# Patient Record
Sex: FEMALE | Race: WHITE | NOT HISPANIC OR LATINO | Employment: OTHER | URBAN - METROPOLITAN AREA
[De-identification: names, ages, dates, MRNs, and addresses within clinical notes are randomized per-mention and may not be internally consistent; named-entity substitution may affect disease eponyms.]

---

## 2024-06-14 ENCOUNTER — TRANSFERRED RECORDS (OUTPATIENT)
Dept: HEALTH INFORMATION MANAGEMENT | Facility: CLINIC | Age: 69
End: 2024-06-14

## 2024-08-02 ENCOUNTER — DOCUMENTATION ONLY (OUTPATIENT)
Dept: TRANSPLANT | Facility: CLINIC | Age: 69
End: 2024-08-02

## 2024-08-02 ENCOUNTER — TELEPHONE (OUTPATIENT)
Dept: TRANSPLANT | Facility: CLINIC | Age: 69
End: 2024-08-02

## 2024-08-02 NOTE — TELEPHONE ENCOUNTER
"Donor Intake Start:24Donor Intake Complete:24  Expiration Date:24  Gender:FemalePreferred Language:English  Full Name:Shaniqua James  Needed:[not answered]  Preferred Name:Shaniqua  Phone Number:[phone] Phone:[phone]  Contact Preference:[not answered]Best Contact Time:10am - 5pm  Emergency Contact:Grady Pace Contact #:[phone]  Relationship to Contact:Contact is my spouse  :59Age:68  Country:[place]  Address:[address] [place] [address] ECity:Bianca  [id number]  Height:5'6\"Weight:140lbs  BMI:22.6  Employment Status:RetiredHas PTO for donation?[not answered]  Occupation:[not answered]Requires Heavy Lifting?[not answered]  Education Level:[employer] School Or AssocMarital Status:  Exercise Routine:4-6/WeekHealth Insurance:  Yes  Blood Type:OEthnicity/Race:White  Donor Type:Directed Donor  Prefer Remote Donation:[not answered]  Physician:Dr. Corey Valenzuela, [place]  Motivation to donate:  My brother needs a kidney transplant  Living Donor Pre-Screening  Is In [place]  Yes  Will Accept Blood Transfusions?  Yes  Has been Diagnosed with Kidney Disease?  No  Has had a Heart Attack?  No  Has Diabetes?  No  Has had Cancer?  No  Has had Kidney Stones?  Yes    - number of episodes?2    - last stones passed?2 years    - last stones treated?With medical intervention  Has ever been Pregnant?  Yes    - Is Currently Pregnant?  No    - Months Since Pregnancy?24+    - Is Currently Nursing?  No    - Gestational Diabetes?  Yes    - Hypertension during pregnancy?  Never  Is Planning on Pregnancy?  No  Is Taking Birth Control?  No  Has Used Tobacco  No  Has HIV?  No  Is Currently Incarcerated?  No  Is Currently Residing in [place]  Yes  History Misc  Has Allergies?  Yes  Allergy  Cats, trees and grasses  Has had Surgeries?  Yes  Surgery When  Shoulder-right 2012  Shoulder-left 2014  Ankle 2016  Thumb/wrist   Takes Medication?  No  Medical History  History of High " BP?  Never  Has History Of CABG (bypass surgery)?  No  History of Blood Clots?  Never  History of Coronary Disease?  Never  Has Stents Implanted?  No  Has History of Chest Pain with Exercise?  No  Has History of Chest Pain at Other Times?  No  Results of Climbing 2 Flights of Stairs?No Problem  Has had Stress Test within Last Year?  No  Has had Stroke?  No  Has had Leg Bypass?  No  History of Lung Disease?  Never  History of COPD?  Never  History of TB?  Never    - Is TB Active?[not answered]  History of Pneumonia?  Never  Has Respiratory Issues?  No  Has Gastro Issues?  No  History of Gallstones?  Never  History of Pancreatitis?  Never  History of Liver Disease?  Never  History of Hepatitis B?  Never    - Is Hep B Active?[not answered]  History of Hepatitis C?  Never  History of Bleeding Problem?  Never  History of UTIs?  Yes    - UTI episodes:3    - Last UTI:Current (less than 1yr)  History of Kidney Damage?  Never  History of Proteinuria?  Unknown  History of Hematuria?  Treated in past  History of Neuro Disease?  Never  History of Seizure?  Never  History of Lupus?  Never  History of Paralysis?  Never  History of Arthritis?  Still being treated  History of Neuropathy?  Still being treated  History of Depression?  Never  History of Anxiety?  Never  History of Documented Psychiatric Illness?  Never  History of Fibroid Uterus?  Never  History of Endometriosis?  Never  History of Polycystic Ovaries?  Never  Has had Miscarriages?  No  Has had Abortions?  No  Has had Transfusions?  No  History of Obesity?  No  History of Fabry's Disease?  No  History of Sickle Cell Disease?  No  History of Sickle Cell Trait?  No  History of Sarcoidosis?  No  History of Auto-Immune Disease  Unknown  Has had Physical Exam?  Yes    - how many years ago:1  Has had Mammogram?  Yes    - how many years ago:1  Has had Pap Smear?  Yes    - how many years ago:1  Has had Colonoscopy?  Yes    - How Many Years Ago:2  Medical History  Comments?Fibromyalgia  Living Donor Family Medical History  Anyone with kidney disease?  Yes    - which family members:Mom and brother  Anyone with liver disease?  No  Anyone with heart disease?  Yes    - which family members:Mom  Anyone with coronary artery disease?  Unknown  Anyone with high blood pressure?  Yes    - which family members:Father, mother, brothers  Anyone with blood disorder?  No  Anyone with cancer?  Unknown  Anyone with kidney cancer?  No  Anyone with diabetes?  No  Is mother alive?  No  Mother's age?89  Mother's cause of death?Kidney failure  Is father alive?  No  Father's age?83  Father's cause of death?Lung failure  How many siblings?2  How many adult children?2  How many children under 18?0  Social History  Has Used Alcohol?  Yes    - currently uses alcohol:  Yes    - how much:1/Daily  Has Abused Alcohol?  No  Has Used Drugs?  No  Has had legal issues w/ law enforcement?  No  Traveled over 100 miles from home in last year?  Yes    - Traveled Where?[place] and [place]  Has had suicidal thoughts or attempts in the last five years?  No

## 2024-08-06 ENCOUNTER — TELEPHONE (OUTPATIENT)
Dept: TRANSPLANT | Facility: CLINIC | Age: 69
End: 2024-08-06

## 2024-08-06 NOTE — TELEPHONE ENCOUNTER
Initial Independent Living Donor Advocate contact made with potential donor today.  I introduced myself and my role during the donation process, including:   ANGELIKA ROLE   The federal government requires that all licensed transplant centers provide the living donor with an Independent Living Donor Advocate (ANGELIKA).  I do not meet recipients or attend meetings that discuss their care or decision to transplant them. My role is separate to avoid any conflict of interest.  My role is to ensure:  1) your rights are protected;  2) you get all the information you need from the transplant team to make a fully informed decision whether to donate;   3) that living donation is in your best interest.   4) that you have the right to decide NOT to go forward with living donation at any time during this process.  I am available to you throughout the workup, during surgery phase and follow-up at home.     WORKUP & PRIVACY   Your identity and workup are not shared with the recipient at any time.   The recipient's insurance covers the medical expenses related to the donor evaluation and surgery.  However, it is important that you carry your own health insurance to address any medical issues that are found and are NOT related to living donation.  Additionally, age appropriate cancer screening (I.e. mammograms,  colonoscopies, etc) is required and would be through your insurance.  There is a psychosocial and medical donor workup that consists of testing to determine if you are healthy enough to donate. Workup tests include tissue typing/genetics, many blood draws, urine collection/ (kidney function testing), chest x-ray, EKG/other heart testing, CT scan. Age appropriate cancer screening is required and would be through your insurance. As you complete each step then you may move on to the next.  Workup can take as little or as long as you need and you can stop the process at any time. Transplant is a treatment option, not a cure. A kidney  from a living kidney donor can last 12-14 years.  Other treatment options are  donation and two types of dialysis.   This is major surgery and your estimated hospital stay is approximately 1-2 nights.  After surgery, there are driving and lifting restrictions - no driving for two weeks and no lifting over ten pounds for 8 weeks.  Donors are routinely off from work for 4 - 6 weeks after surgery, and potentially longer if they have a physical job.     If you anticipate lost wages due to donation, donor wage reimbursement options may be available to you and will be reviewed with you during the evaluation process. Donor Shield and NLDAC explained.  We reviewed the importance of completing follow-up labs and surveys at six months, 1 year and 2 years after donation to monitor kidney health and the impact donation has had on their life post donation.        QUESTIONS    Have you received the Welcome e-mail that includes copies of the informed consent, financial letter, information on donor shield and NLDAC from the transplant department? Yes.    Have you discussed with anyone your potential decision to donate?   Yes.    Is anyone pressuring or coercing you to donate? No.    Have you discussed any financial arrangements with recipient around donating a kidney? No.    Are you aware that you can confidentially opt out at any time, up to and including day of donation? Yes.    At this time, would you like to proceed with the medical evaluation to see if you can be a kidney donor?  Yes.    If yes, I will make an appointment for your donor coordinator to reach out to you with next steps.     Contact information for ANGELIKA's was provided Yes.    Demographic Information:   Preferred Name: Shaniqua  Preferred Pronouns: she.her  Race/Ethnicity: white    Eulalia Lopez- [phone]  Virginia Demar-  [phone]    Confirmed that she reviewed Informed consent document and all questions answered.  Reviewed that they will receive  Docusign to obtain electronic signature for the following: Informed consent, SRTR data, TANYA for medical information, Auth for Electronic communication, Kidney for Life and will need their signed consent back before proceeding with evaluation.     Time frame for donation: open  Paired exchange was introduced  Yes.      MyChart was initiated  Yes.  CareEverywhere was initiated Yes.    ANGELIKA NOTES: Shaniqua wants to donate to brother. Lives in [place]. Retired. Has health insurance. Scheduled to talk to Marian Vick on 8/13 at 10:45am.     Virginia Benz MaineGeneral Medical CenterGT, CCTSW   Independent Living Donor Advocate  [facility]  Direct: [phone]  E-Mail: [email or web address]    Duration of call 30 minutes

## 2024-08-13 ENCOUNTER — DOCUMENTATION ONLY (OUTPATIENT)
Dept: TRANSPLANT | Facility: CLINIC | Age: 69
End: 2024-08-13

## 2024-08-13 ENCOUNTER — TELEPHONE (OUTPATIENT)
Dept: TRANSPLANT | Facility: CLINIC | Age: 69
End: 2024-08-13

## 2024-08-13 ENCOUNTER — MEDICAL CORRESPONDENCE (OUTPATIENT)
Dept: HEALTH INFORMATION MANAGEMENT | Facility: CLINIC | Age: 69
End: 2024-08-13

## 2024-08-13 NOTE — TELEPHONE ENCOUNTER
Contacted Shaniqua Perez to introduce myself and my role, review of medical/surgical/family history and next steps.     Shaniqua Perez  is aware She can stop donor evaluation at any time.    Regular blood donor? No Last blood donation date NA (Notified donor to avoid blood donation at this time to get accurate blood counts if going through evaluation)     Shaniqua Perez is a 68 year old female  ABO O that would like to learn more about being a donor to her brother. 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     Fibromyalgia- dx around age 42, managed with exercise and lifestyle, doesn't feel it limits her life, lives a very active lifestyle, walks 3-4 miles daily    Current medications and NSAID use:   Prozac- hormonal mood fluctuations  Synthroid  Statin medication for cholesterol   Prn allergy medication   Prn sleeping pill     Allergies reviewed: environmental- cats, grass, trees    Legal issues w/ law enforcement: none    Reviewed any history of travel in endemic areas: [place]  Strongyloides- [place], [place] and [place].  Trypanosoma cruzi ([place])- [place]  West Nile Virus- [place], [place], [place], [place] and [place]. (Included in BRANDON testing prior to donation)    Per our Phase 1 algorithm, does meet criteria to do preliminary testing. Social work screening yes due to travel.      Verified that potential donor was provided the informed consent DocuSign and they are comfortable moving forward to living donor evaluation.    Reviewed evaluation testing: Iohexol, Lab work, CXR, EKG, Provider visits and functions, CT Angiogram.     Reviewed operations  of selection committee and angio review meetings and the need for multidisciplinary input. Post-donation requirements include post-op appointment with your surgeon at 2 weeks after surgery, 6 week, 6 month, 1 year and 2 year lab tests.     Reviewed NKR listing and transfer of care to [facility] team if approved. Provided with NKR website to review.     Briefly went over options if approved of NDD and voucher donation.     Shaniqua would like to proceed with next steps: P1 testing and tissue typing     Encouraged sign up for MyChart and reviewed importance of watching teaching videos prior to evaluation.    Verified recipient status if not NDD.    Donor timeline: tbd     Will send orders to scheduling team to set up for P1 testing.

## 2024-08-15 ENCOUNTER — TRANSFERRED RECORDS (OUTPATIENT)
Dept: HEALTH INFORMATION MANAGEMENT | Facility: CLINIC | Age: 69
End: 2024-08-15

## 2024-09-04 ENCOUNTER — DOCUMENTATION ONLY (OUTPATIENT)
Dept: TRANSPLANT | Facility: CLINIC | Age: 69
End: 2024-09-04

## 2024-09-04 ENCOUNTER — TELEPHONE (OUTPATIENT)
Dept: TRANSPLANT | Facility: CLINIC | Age: 69
End: 2024-09-04

## 2024-09-04 NOTE — PROGRESS NOTES
Incompatible Kidney Transplant Program Meeting     Attendees: Dr. Santillan, Dr. Knox, donor/recip coordinator teams    Discussion:   Compatibility testing including virtual from 3g done by immunology-     ABDRDQmm 2111, medium eplet (DRolinda 10, DQmm11), some DSA on virtual but likely okay pending flow XM.     Recommendations:   Consider paired trail if open to paired exchange, otherwise would need a flow XM to confirm okay for direct.

## 2024-09-04 NOTE — TELEPHONE ENCOUNTER
P1 results appropriate to proceed. Compatibility testing reviewed at [facility] (see note).     Call made to Shaniqua to review above. Plan for [facility] if she wants to proceed.

## 2024-09-04 NOTE — LETTER
PHYSICIAN ORDERS  DONOR LAB ORDERS      Patient Name: Shaniqua Perez   YOB: 1955     [facility] MR# [if applicable]: [id number]   Date & Time: September 10, 2024  2:31 PM  DIAGNOSIS:  Potential kidney donor  ICD-10 CODE:  Z00.5    Interim Transplant Crossmatch [Donor]    1. Please draw enclosed tubes (4 yellow/ACD tubes) to equal 40ml of blood for final pre-transplant crossmatch that will be run at our facility. Please initial and date all tubes. Ship at room temperature.     **Do not draw blood or ship on Friday or Saturday.    2. Send blood to:  Immunology Laboratory     [facility]     Room 7-139 [address] [address]    *See below billing instructions for lab draw.             Mair Hernandez MD FACS  Associate Professor of Surgery  Director, Living Kidney Donor Program.         REIMBURSEMENT INFORMATION FOR LIVING ORGAN DONORS    LIVING ORGAN DONOR: This form MUST accompany & remain attached to Orders &  given to Provider and/or Healthcare Facility Business Office    PROVIDER/FACILITY INSTRUCTIONS: By accepting to perform these services for living organ  donation, the provider/facility agrees to exclusively bill the [facility] instead of billing  the patient or any insurance provider and agrees to accept the reimbursement, as described below, as  payment in full for services rendered.    PROVIDER BILLING INSTRUCTIONS:  1. [facility] agrees to pay for all authorized testing ordered by our transplant  program that is related to living organ donation. The attached orders/tests are part of the donor  Evaluation.    2. Do not bill the donor or donor's insurance. Send an itemized invoice, claim or statement to:    [facility]  Transplant Finance/Donor Billing  [address] [place] [address], [address] [address]    3. Billing statements must include the  patient first and last name, date of birth, the CPT procedure code  and date of service. Please bill service on the ORIGINAL UBO4 or 1500 with appropriate CPT/HCPCS  codes along with W-9 and send to the above address to insure timely reimbursement.    4. Claims should be submitted no later than six months from the date when services are rendered.  Claims denied for late submission should not be billed to the donor or their private insurance carrier.    5. [facility] will reimburse all charges at 100% of the Medicare Fee Schedule as  defined in the Code of Federal Regulations (CFR) 42, Chapter IV. This is to be considered payment  in full. [facility], the patient, and/or the patient's insurance are NOT to  be billed any balance, co-payment, or deductible, per Medicare regulations. **ATTN: Facility  providing services for attached/enclosed Living Donor Orders; If facility does NOT AGREE to  the reimbursement rate stated above, PLEASE DENY SERVICES & refer Donor/patient back to  their [facility] Transplant Center.    6. Patients are NOT to make any payments at the time of service.    Please forward this information to your billing department so that a donor account can be set up with  these instructions.    Should you have any questions, please contact the Donor Billing office at [phone],  Monday - Friday, 8:00 a.m. to 4:00 p.m.   Thank you for your assistance.

## 2024-09-10 ENCOUNTER — DOCUMENTATION ONLY (OUTPATIENT)
Dept: TRANSPLANT | Facility: CLINIC | Age: 69
End: 2024-09-10

## 2024-09-10 NOTE — PROGRESS NOTES
Sent to Shaniqua via Email [employer] orders with billing letter and instructions. Orders faxed to [employer] company.

## 2024-09-10 NOTE — TELEPHONE ENCOUNTER
Reviewed status of referral and IKTP recommendations.     Plan established to proceed with interim cross match and litholink. Request placed for cross match kit and litholink.     Shaniqua will confirm when she has the kit and plans to have blood drawn.

## 2024-09-17 ENCOUNTER — TELEPHONE (OUTPATIENT)
Dept: TRANSPLANT | Facility: CLINIC | Age: 69
End: 2024-09-17

## 2024-09-17 DIAGNOSIS — Z00.5 TRANSPLANT DONOR EVALUATION: Primary | ICD-10-CM

## 2024-09-17 NOTE — TELEPHONE ENCOUNTER
Shaniqua received interim cross match kit. She plans to have blood drawn 9/18/24. Shaniqua knows to ensure the blood is shipped out same day and overnight (return  provided). Confirmed with immunology that new recip sample not needed. Shaniqua knows how to reach me with questions. Orders placed.

## 2024-09-18 ENCOUNTER — DOCUMENTATION ONLY (OUTPATIENT)
Dept: TRANSPLANT | Facility: CLINIC | Age: 69
End: 2024-09-18

## 2024-09-18 ENCOUNTER — LAB (OUTPATIENT)
Dept: LAB | Facility: CLINIC | Age: 69
End: 2024-09-18

## 2024-09-18 DIAGNOSIS — Z00.5 TRANSPLANT DONOR EVALUATION: ICD-10-CM

## 2024-09-18 PROCEDURE — 81378 HLA I & II TYPING HR: CPT

## 2024-09-18 NOTE — PROGRESS NOTES
Shaniqua sent an Email telling me that she called [facility] and they had no order for her to do test. I now refaxed order to [employer] company.

## 2024-09-24 LAB
A*: NORMAL
A*LOCUS SEROLOGIC EQUIVALENT: 1
A*LOCUS: NORMAL
A*SEROLOGIC EQUIVALENT: 2
ABTEST METHOD: NORMAL
B*: NORMAL
B*LOCUS SEROLOGIC EQUIVALENT: 8
B*LOCUS: NORMAL
B*SEROLOGIC EQUIVALENT: 44
BW-1: NORMAL
BW-2: NORMAL
C*: NORMAL
C*LOCUS SEROLOGIC EQUIVALENT: 5
C*LOCUS: NORMAL
C*SEROLOGIC EQUIVALENT: 7
DPA1*: NORMAL
DPB1*: NORMAL
DQA1*: NORMAL
DQA1*LOCUS: NORMAL
DQB1*: NORMAL
DQB1*LOCUS SEROLOGIC EQUIVALENT: 2
DQB1*LOCUS: NORMAL
DQB1*SEROLOGIC EQUIVALENT: 7
DRB1*: NORMAL
DRB1*LOCUS SEROLOGIC EQUIVALENT: 17
DRB1*LOCUS: NORMAL
DRB1*SEROLOGIC EQUIVALENT: 4
DRB3*LOCUS SEROLOGIC EQUIVALENT: 52
DRB3*LOCUS: NORMAL
DRB4*: NORMAL
DRB4*SEROLOGIC EQUIVALENT: 53
DRSSO TEST METHOD: NORMAL

## 2024-09-25 ENCOUNTER — DOCUMENTATION ONLY (OUTPATIENT)
Dept: TRANSPLANT | Facility: CLINIC | Age: 69
End: 2024-09-25

## 2024-09-25 NOTE — PROGRESS NOTES
Incompatible Kidney Transplant Program Meeting     Attendees: Dr. Santillan, Dr. Nj, donor and recip coordinator teams    Discussion:   Compatibility testing including donor/recip characteristics and flow cross match reviewed. No DSA, size slightly suboptimal but okay.     Recommendations:   Okay for direct donation pending evaluation.

## 2024-10-14 ENCOUNTER — TELEPHONE (OUTPATIENT)
Dept: TRANSPLANT | Facility: CLINIC | Age: 69
End: 2024-10-14

## 2024-10-14 NOTE — TELEPHONE ENCOUNTER
Shaniqua updated of recommendations from IKTP. She verbalized understanding and confirmed plan to complete her litholink this week. Pending litholink, plan to schedule evaluation, Shaniqua is aware.

## 2024-10-16 ENCOUNTER — DOCUMENTATION ONLY (OUTPATIENT)
Dept: TRANSPLANT | Facility: CLINIC | Age: 69
End: 2024-10-16

## 2024-10-16 NOTE — PROGRESS NOTES
"Received this Email from Shaniqua:  \"I finishes the 24 hour Urine test this morning. They are picking it up today October 16. Shaniqua Perez [phone]\"  Informed her we will watch for results.      "

## 2024-10-23 ENCOUNTER — DOCUMENTATION ONLY (OUTPATIENT)
Dept: TRANSPLANT | Facility: CLINIC | Age: 69
End: 2024-10-23

## 2024-10-24 ENCOUNTER — DOCUMENTATION ONLY (OUTPATIENT)
Dept: TRANSPLANT | Facility: CLINIC | Age: 69
End: 2024-10-24

## 2024-10-24 NOTE — PROGRESS NOTES
Ank results routed to dietician and nephrology. Will update Shaniqua with any recommendations or okay to proceed with scheduling evaluation.

## 2024-10-25 ENCOUNTER — TELEPHONE (OUTPATIENT)
Dept: TRANSPLANT | Facility: CLINIC | Age: 69
End: 2024-10-25

## 2024-10-25 NOTE — TELEPHONE ENCOUNTER
Please call Shaniqua;  She is wondering what her Urine test  results are?    DOS(10/16/2024)    Please call Shaniqua she is very worried about her test results.

## 2024-10-28 NOTE — TELEPHONE ENCOUNTER
[facility] reviewed by dietician Phyllis Norman and Dr. Donavan Roberts who advised the following:   -Phyllis advised drinking 2.5-3L mostly water/day and urine citrate also a little low, so could try adding 2-4 ounces lemon/lime juice to her water per day.   -Dr. Donavan Roberts agreed with above recommendation and advised repeat litholink 6-8 weeks.    Shaniqua updated of above. She she verbalized understanding. Plan established to proceed with scheduling evaluation and repeat litholink per above.

## 2024-10-29 ENCOUNTER — DOCUMENTATION ONLY (OUTPATIENT)
Dept: TRANSPLANT | Facility: CLINIC | Age: 69
End: 2024-10-29

## 2024-10-29 NOTE — PROGRESS NOTES
Shaniqua wanted to wait with sched eval and wants to be called in Dec. States she spoke with spouse and brother/recip. Shaniqua states she will have done the 2nd [employer] by that time and the recip will be closer to being ready. Was thinking Riaz would be better time to sched the eval. Agreed to be called the 1st week in Dec and we will discuss more.

## 2024-11-05 ENCOUNTER — TELEPHONE (OUTPATIENT)
Dept: TRANSPLANT | Facility: CLINIC | Age: 69
End: 2024-11-05

## 2024-11-05 DIAGNOSIS — Z00.5 TRANSPLANT DONOR EVALUATION: Primary | ICD-10-CM

## 2024-11-05 NOTE — LETTER
PHYSICIAN ORDERS  DONOR LAB ORDERS      Patient Name: Shaniqua Perez   YOB: 1955     [facility] MR# [if applicable]: [id number]   Date & Time: November 6, 2024  10:09 AM  DIAGNOSIS:  Potential kidney donor  ICD-10 CODE:  Z00.5      BMP    Please see below billing instructions and fax results to [phone].               Chon Meadows MD           REIMBURSEMENT INFORMATION FOR LIVING ORGAN DONORS    LIVING ORGAN DONOR: This form MUST accompany & remain attached to Orders &  given to Provider and/or Healthcare Facility Business Office    PROVIDER/FACILITY INSTRUCTIONS: By accepting to perform these services for living organ  donation, the provider/facility agrees to exclusively bill the [facility] instead of billing  the patient or any insurance provider and agrees to accept the reimbursement, as described below, as  payment in full for services rendered.    PROVIDER BILLING INSTRUCTIONS:  1. [facility] agrees to pay for all authorized testing ordered by our transplant  program that is related to living organ donation. The attached orders/tests are part of the donor  Evaluation.    2. Do not bill the donor or donor's insurance. Send an itemized invoice, claim or statement to:    [facility]  Transplant Finance/Donor Billing  [address] [place] [address]    3. Billing statements must include the patient first and last name, date of birth, the CPT procedure code  and date of service. Please bill service on the ORIGINAL UBO4 or 1500 with appropriate CPT/HCPCS  codes along with W-9 and send to the above address to insure timely reimbursement.    4. Claims should be submitted no later than six months from the date when services are rendered.  Claims denied for late submission should not be billed to the donor or their private insurance carrier.    5. [facility] will reimburse all charges at 100%  of the Medicare Fee Schedule as  defined in the Code of Federal Regulations (CFR) 42, Chapter IV. This is to be considered payment  in full. [facility], the patient, and/or the patient's insurance are NOT to  be billed any balance, co-payment, or deductible, per Medicare regulations. **ATTN: Facility  providing services for attached/enclosed Living Donor Orders; If facility does NOT AGREE to  the reimbursement rate stated above, PLEASE DENY SERVICES & refer Donor/patient back to  their [facility] Care Coordinator Transplant Center.    6. Patients are NOT to make any payments at the time of service.    Please forward this information to your billing department so that a donor account can be set up with  these instructions.    Should you have any questions, please contact the Donor Billing office at [phone],  Monday - Friday, 8:00 a.m. to 4:00 p.m.   Thank you for your assistance.

## 2024-11-05 NOTE — TELEPHONE ENCOUNTER
My Chart messages from Shaniqua reporting concerns with fluid intake and lemon/lime juice recommendations based on litholink reviewed with Dr. Donavan Roberts. Dr. Donavan Roberts advised the following:   -could try k citrate 10meq bid after obtaining a baseline BMP  -keep fluid intake around 2L if unable to tolerate more  -if unable to tolerate above, could just monitor and complete evaluation including repeat litholink  Attempted to reach Shaniqua to update her of above, VM left.     November 6, 2024 10:06 AM - Marian Vick RN:   Spoke with Shaniqua and reviewed above. She would like to try the k citrate so requested order for BMP to have done locally. Order sent to her. She will let me know once completed and confirm okay to start supplement.

## 2024-11-18 ENCOUNTER — DOCUMENTATION ONLY (OUTPATIENT)
Dept: TRANSPLANT | Facility: CLINIC | Age: 69
End: 2024-11-18

## 2024-11-18 ENCOUNTER — TELEPHONE (OUTPATIENT)
Dept: TRANSPLANT | Facility: CLINIC | Age: 69
End: 2024-11-18

## 2024-11-18 NOTE — TELEPHONE ENCOUNTER
Baseline BMP completed and reviewed with Dr. Donavan Roberts who advised Shaniqua follow up with her PCP to discuss starting k-citrate. Shaniqua updated of above. She verbalized understanding and comfort with this plan. Litholink results faxed to PCP- Dr. Abdi Esquivel. Shaniqua aware of plan to repeat litholink after she has implemented increased fluid intake and k-citrate. Request placed for litholink order.

## 2024-11-18 NOTE — CONFIDENTIAL NOTE
Emailed to Shaniqua the [employer] orders/instructions for future [employer] test.Faxed orders to [employer] company.

## 2024-12-12 ENCOUNTER — DOCUMENTATION ONLY (OUTPATIENT)
Dept: TRANSPLANT | Facility: CLINIC | Age: 69
End: 2024-12-12

## 2024-12-12 NOTE — PROGRESS NOTES
Left 2nd message(messages sent last week)asking if she could contact me re:if she wants to schedule Eval in Jan.

## 2024-12-23 ENCOUNTER — TELEPHONE (OUTPATIENT)
Dept: TRANSPLANT | Facility: CLINIC | Age: 69
End: 2024-12-23

## 2024-12-23 NOTE — TELEPHONE ENCOUNTER
GT spoke with Shaniqua regarding her travel to [place] from [place] for kidney donor evaluation. Shaniqua clarified she does not need hotel assistance (will stay with brother) but needs flight assistance. GT let Shaniqua know, GT will check with our accommodations team regarding [facility] flight voucher availability and get back to her.     GT emailed accommodations, waiting response.      CALVIN Mcmahan, [facility]  Living Donor   Phone: [phone]  Pager: [phone]  [email or web address]

## 2025-01-15 ENCOUNTER — TRANSFERRED RECORDS (OUTPATIENT)
Dept: HEALTH INFORMATION MANAGEMENT | Facility: CLINIC | Age: 70
End: 2025-01-15

## 2025-01-17 ENCOUNTER — TELEPHONE (OUTPATIENT)
Dept: TRANSPLANT | Facility: CLINIC | Age: 70
End: 2025-01-17

## 2025-01-17 NOTE — TELEPHONE ENCOUNTER
Repeat litholink received from Shaniqua. Shaniqua reports doing well. She has consistently increased her fluids to 3L per day and been taking potassium Citrate 10meq bid (prescribed by her PCP). Message sent to Dr. Donavan Roberts and dietician to review results. Shaniqua otherwise ready for evaluation.

## 2025-01-20 ENCOUNTER — DOCUMENTATION ONLY (OUTPATIENT)
Dept: TRANSPLANT | Facility: CLINIC | Age: 70
End: 2025-01-20

## 2025-01-20 NOTE — PROGRESS NOTES
"Received the following response from Dr Donavan Cheung:[employer] results:  \"Recommend increasing Kcitrate to 20 meq po bid. She can repeat litholink in 4-6 weeks. She can be seen for donor evaluation given improvement.\"  Now spoke with Yoan and she will discuss the increasing Kcitrate. Wants to sched eval 1/30.Has spoken to SW re:Voucher for flight. Message to SW to check into and depending on if able to do it then 1/30 is her choice for date.  "

## 2025-01-21 ENCOUNTER — TELEPHONE (OUTPATIENT)
Dept: TRANSPLANT | Facility: CLINIC | Age: 70
End: 2025-01-21

## 2025-01-21 DIAGNOSIS — Z00.5 TRANSPLANT DONOR EVALUATION: Primary | ICD-10-CM

## 2025-01-21 RX ORDER — ALBUTEROL SULFATE 0.83 MG/ML
2.5 SOLUTION RESPIRATORY (INHALATION)
OUTPATIENT
Start: 2025-01-30

## 2025-01-21 RX ORDER — METHYLPREDNISOLONE SODIUM SUCCINATE 40 MG/ML
40 INJECTION INTRAMUSCULAR; INTRAVENOUS
Start: 2025-01-30

## 2025-01-21 RX ORDER — DIPHENHYDRAMINE HYDROCHLORIDE 50 MG/ML
25 INJECTION INTRAMUSCULAR; INTRAVENOUS
Start: 2025-01-30

## 2025-01-21 RX ORDER — ALBUTEROL SULFATE 90 UG/1
1-2 INHALANT RESPIRATORY (INHALATION)
Start: 2025-01-30

## 2025-01-21 RX ORDER — MEPERIDINE HYDROCHLORIDE 25 MG/ML
25 INJECTION INTRAMUSCULAR; INTRAVENOUS; SUBCUTANEOUS
OUTPATIENT
Start: 2025-01-30

## 2025-01-21 RX ORDER — EPINEPHRINE 1 MG/ML
0.3 INJECTION, SOLUTION, CONCENTRATE INTRAVENOUS EVERY 5 MIN PRN
OUTPATIENT
Start: 2025-01-30

## 2025-01-21 RX ORDER — DIPHENHYDRAMINE HYDROCHLORIDE 50 MG/ML
50 INJECTION INTRAMUSCULAR; INTRAVENOUS
Start: 2025-01-30

## 2025-01-21 NOTE — TELEPHONE ENCOUNTER
Shaniqua scheduled for evaluation. Call made to review teaching. Shaniqua verbalized understanding and comfort with plan. Confirmed she is reaching out to her PCP regarding increasing KCitrate per Dr. Donavan Roberts based on her last litholink, litholink results faxed to her PCP (Dr. Esquivel). Shaniqua knows how to reach us with questions.

## 2025-01-28 ENCOUNTER — TELEPHONE (OUTPATIENT)
Dept: TRANSPLANT | Facility: CLINIC | Age: 70
End: 2025-01-28

## 2025-01-28 NOTE — TELEPHONE ENCOUNTER
Stress echo rescheduled for 1/30/25. Shaniqua updated of date/time change. Pre procedure instructions also reviewed. Shaniqua verbalized understanding.

## 2025-01-28 NOTE — TELEPHONE ENCOUNTER
Patient Call: General  Route to LPN    Reason for call: Per pt all her eval appts were scheduled for 1/30/25  and stress test was at the end of the day- now she looked at her agenda and has a stress test on 1/31/25- 0745- that will not work ut  will be flying out     Call back needed? Yes    Return Call Needed  Same as documented in contacts section  When to return call?: Same day: Route High Priority

## 2025-01-29 LAB
ABO + RH BLD: NORMAL
BLD GP AB SCN SERPL QL: NEGATIVE
SPECIMEN EXP DATE BLD: NORMAL

## 2025-01-30 ENCOUNTER — ALLIED HEALTH/NURSE VISIT (OUTPATIENT)
Dept: TRANSPLANT | Facility: CLINIC | Age: 70
End: 2025-01-30
Attending: INTERNAL MEDICINE

## 2025-01-30 ENCOUNTER — OFFICE VISIT (OUTPATIENT)
Dept: TRANSPLANT | Facility: CLINIC | Age: 70
End: 2025-01-30
Attending: INTERNAL MEDICINE

## 2025-01-30 ENCOUNTER — OFFICE VISIT (OUTPATIENT)
Dept: INFUSION THERAPY | Facility: CLINIC | Age: 70
End: 2025-01-30
Attending: INTERNAL MEDICINE

## 2025-01-30 ENCOUNTER — LAB (OUTPATIENT)
Dept: LAB | Facility: CLINIC | Age: 70
End: 2025-01-30
Attending: INTERNAL MEDICINE

## 2025-01-30 VITALS
HEART RATE: 74 BPM | SYSTOLIC BLOOD PRESSURE: 122 MMHG | HEIGHT: 66 IN | RESPIRATION RATE: 18 BRPM | WEIGHT: 142.6 LBS | OXYGEN SATURATION: 98 % | TEMPERATURE: 97.5 F | BODY MASS INDEX: 22.92 KG/M2 | DIASTOLIC BLOOD PRESSURE: 68 MMHG

## 2025-01-30 VITALS
DIASTOLIC BLOOD PRESSURE: 86 MMHG | SYSTOLIC BLOOD PRESSURE: 132 MMHG | HEART RATE: 64 BPM | HEIGHT: 67 IN | BODY MASS INDEX: 22.67 KG/M2

## 2025-01-30 DIAGNOSIS — Z00.5 TRANSPLANT DONOR EVALUATION: ICD-10-CM

## 2025-01-30 DIAGNOSIS — Z00.5 TRANSPLANT DONOR EVALUATION: Primary | ICD-10-CM

## 2025-01-30 LAB
ABO + RH BLD: NORMAL
ALBUMIN MFR UR ELPH: 6.2 MG/DL
ALBUMIN SERPL BCG-MCNC: 4.5 G/DL (ref 3.5–5.2)
ALBUMIN UR-MCNC: NEGATIVE MG/DL
ALBUMIN UR-MCNC: NEGATIVE MG/DL
ALP SERPL-CCNC: 89 U/L (ref 40–150)
ALT SERPL W P-5'-P-CCNC: 22 U/L (ref 0–50)
ANION GAP SERPL CALCULATED.3IONS-SCNC: 8 MMOL/L (ref 7–15)
APPEARANCE UR: CLEAR
APPEARANCE UR: CLEAR
APTT PPP: 26 SECONDS (ref 22–38)
AST SERPL W P-5'-P-CCNC: 33 U/L (ref 0–45)
BILIRUB SERPL-MCNC: 0.4 MG/DL
BILIRUB UR QL STRIP: NEGATIVE
BILIRUB UR QL STRIP: NEGATIVE
BUN SERPL-MCNC: 9.6 MG/DL (ref 8–23)
CALCIUM SERPL-MCNC: 9.3 MG/DL (ref 8.8–10.4)
CHLORIDE SERPL-SCNC: 100 MMOL/L (ref 98–107)
CHOLEST SERPL-MCNC: 179 MG/DL
COLOR UR AUTO: ABNORMAL
COLOR UR AUTO: NORMAL
CREAT SERPL-MCNC: 0.71 MG/DL (ref 0.51–0.95)
CREAT UR-MCNC: 65 MG/DL
CREAT UR-MCNC: 66.6 MG/DL
CYSTATIN C (ROCHE): 0.9 MG/L (ref 0.6–1)
EGFRCR SERPLBLD CKD-EPI 2021: >90 ML/MIN/1.73M2
ERYTHROCYTE [DISTWIDTH] IN BLOOD BY AUTOMATED COUNT: 13.9 % (ref 10–15)
EST. AVERAGE GLUCOSE BLD GHB EST-MCNC: 120 MG/DL
FASTING STATUS PATIENT QL REPORTED: YES
FASTING STATUS PATIENT QL REPORTED: YES
GFR/BSA.PRED SERPLBLD CYS-BASED-ARV: 81 ML/MIN/1.73M2
GLUCOSE SERPL-MCNC: 94 MG/DL (ref 70–99)
GLUCOSE UR STRIP-MCNC: NEGATIVE MG/DL
GLUCOSE UR STRIP-MCNC: NEGATIVE MG/DL
HBA1C MFR BLD: 5.8 %
HBV CORE AB SERPL QL IA: NONREACTIVE
HBV SURFACE AB SERPL IA-ACNC: 29 M[IU]/ML
HBV SURFACE AB SERPL IA-ACNC: REACTIVE M[IU]/ML
HBV SURFACE AG SERPL QL IA: NONREACTIVE
HCO3 SERPL-SCNC: 27 MMOL/L (ref 22–29)
HCT VFR BLD AUTO: 41.1 % (ref 35–47)
HCV AB SERPL QL IA: NONREACTIVE
HDLC SERPL-MCNC: 97 MG/DL
HGB BLD-MCNC: 13.9 G/DL (ref 11.7–15.7)
HGB UR QL STRIP: NEGATIVE
HGB UR QL STRIP: NEGATIVE
HIV 1+2 AB+HIV1 P24 AG SERPL QL IA: NONREACTIVE
INR PPP: 0.96 (ref 0.85–1.15)
KETONES UR STRIP-MCNC: NEGATIVE MG/DL
KETONES UR STRIP-MCNC: NEGATIVE MG/DL
LDLC SERPL CALC-MCNC: 65 MG/DL
LEUKOCYTE ESTERASE UR QL STRIP: ABNORMAL
LEUKOCYTE ESTERASE UR QL STRIP: NEGATIVE
MCH RBC QN AUTO: 31.9 PG (ref 26.5–33)
MCHC RBC AUTO-ENTMCNC: 33.8 G/DL (ref 31.5–36.5)
MCV RBC AUTO: 94 FL (ref 78–100)
MICROALBUMIN UR-MCNC: <12 MG/L
MICROALBUMIN/CREAT UR: NORMAL MG/G{CREAT}
NITRATE UR QL: NEGATIVE
NITRATE UR QL: NEGATIVE
NONHDLC SERPL-MCNC: 82 MG/DL
PH UR STRIP: 6.5 [PH] (ref 5–7)
PH UR STRIP: 7 [PH] (ref 5–7)
PHOSPHATE SERPL-MCNC: 3.7 MG/DL (ref 2.5–4.5)
PLATELET # BLD AUTO: 312 10E3/UL (ref 150–450)
POTASSIUM SERPL-SCNC: 3.9 MMOL/L (ref 3.4–5.3)
PROT SERPL-MCNC: 7.2 G/DL (ref 6.4–8.3)
PROT/CREAT 24H UR: 0.09 MG/MG CR (ref 0–0.2)
RBC # BLD AUTO: 4.36 10E6/UL (ref 3.8–5.2)
RBC URINE: 5 /HPF
RBC URINE: <1 /HPF
SODIUM SERPL-SCNC: 135 MMOL/L (ref 135–145)
SP GR UR STRIP: 1 (ref 1–1.03)
SP GR UR STRIP: 1.01 (ref 1–1.03)
SPECIMEN EXP DATE BLD: NORMAL
SQUAMOUS EPITHELIAL: 7 /HPF
SQUAMOUS EPITHELIAL: <1 /HPF
T PALLIDUM AB SER QL: NONREACTIVE
TRANSITIONAL EPI: <1 /HPF
TRANSITIONAL EPI: <1 /HPF
TRIGL SERPL-MCNC: 83 MG/DL
TSH SERPL DL<=0.005 MIU/L-ACNC: 0.7 UIU/ML (ref 0.3–4.2)
URATE SERPL-MCNC: 5.2 MG/DL (ref 2.4–5.7)
UROBILINOGEN UR STRIP-MCNC: NORMAL MG/DL
UROBILINOGEN UR STRIP-MCNC: NORMAL MG/DL
WBC # BLD AUTO: 5.7 10E3/UL (ref 4–11)
WBC URINE: 1 /HPF
WBC URINE: 4 /HPF

## 2025-01-30 PROCEDURE — 81001 URINALYSIS AUTO W/SCOPE: CPT | Performed by: TRANSPLANT SURGERY

## 2025-01-30 PROCEDURE — 87340 HEPATITIS B SURFACE AG IA: CPT

## 2025-01-30 PROCEDURE — 84443 ASSAY THYROID STIM HORMONE: CPT

## 2025-01-30 PROCEDURE — 80069 RENAL FUNCTION PANEL: CPT

## 2025-01-30 PROCEDURE — 86706 HEP B SURFACE ANTIBODY: CPT

## 2025-01-30 PROCEDURE — 86788 WEST NILE VIRUS AB IGM: CPT

## 2025-01-30 PROCEDURE — 84550 ASSAY OF BLOOD/URIC ACID: CPT

## 2025-01-30 PROCEDURE — 36415 COLL VENOUS BLD VENIPUNCTURE: CPT

## 2025-01-30 PROCEDURE — 86803 HEPATITIS C AB TEST: CPT

## 2025-01-30 PROCEDURE — 86850 RBC ANTIBODY SCREEN: CPT

## 2025-01-30 PROCEDURE — 81001 URINALYSIS AUTO W/SCOPE: CPT

## 2025-01-30 PROCEDURE — 85610 PROTHROMBIN TIME: CPT

## 2025-01-30 PROCEDURE — 80061 LIPID PANEL: CPT

## 2025-01-30 PROCEDURE — 82542 COL CHROMOTOGRAPHY QUAL/QUAN: CPT | Performed by: TRANSPLANT SURGERY

## 2025-01-30 PROCEDURE — 86900 BLOOD TYPING SEROLOGIC ABO: CPT

## 2025-01-30 PROCEDURE — 84156 ASSAY OF PROTEIN URINE: CPT

## 2025-01-30 PROCEDURE — 82043 UR ALBUMIN QUANTITATIVE: CPT

## 2025-01-30 PROCEDURE — 86789 WEST NILE VIRUS ANTIBODY: CPT

## 2025-01-30 PROCEDURE — 99203 OFFICE O/P NEW LOW 30 MIN: CPT | Performed by: TRANSPLANT SURGERY

## 2025-01-30 PROCEDURE — 85049 AUTOMATED PLATELET COUNT: CPT

## 2025-01-30 PROCEDURE — 36415 COLL VENOUS BLD VENIPUNCTURE: CPT | Performed by: TRANSPLANT SURGERY

## 2025-01-30 PROCEDURE — 86780 TREPONEMA PALLIDUM: CPT

## 2025-01-30 PROCEDURE — 83036 HEMOGLOBIN GLYCOSYLATED A1C: CPT

## 2025-01-30 PROCEDURE — 84100 ASSAY OF PHOSPHORUS: CPT

## 2025-01-30 PROCEDURE — 82610 CYSTATIN C: CPT

## 2025-01-30 PROCEDURE — 86704 HEP B CORE ANTIBODY TOTAL: CPT

## 2025-01-30 PROCEDURE — 99213 OFFICE O/P EST LOW 20 MIN: CPT | Performed by: TRANSPLANT SURGERY

## 2025-01-30 PROCEDURE — 85014 HEMATOCRIT: CPT

## 2025-01-30 PROCEDURE — 85730 THROMBOPLASTIN TIME PARTIAL: CPT

## 2025-01-30 PROCEDURE — 255N000002 HC RX 255 OP 636: Performed by: INTERNAL MEDICINE

## 2025-01-30 RX ORDER — EPINEPHRINE 1 MG/ML
0.3 INJECTION, SOLUTION INTRAMUSCULAR; SUBCUTANEOUS EVERY 5 MIN PRN
OUTPATIENT
Start: 2025-01-30

## 2025-01-30 RX ORDER — EZETIMIBE 10 MG/1
10 TABLET ORAL
COMMUNITY

## 2025-01-30 RX ORDER — DIPHENHYDRAMINE HYDROCHLORIDE 50 MG/ML
50 INJECTION INTRAMUSCULAR; INTRAVENOUS
Start: 2025-01-30

## 2025-01-30 RX ORDER — TRIAMCINOLONE ACETONIDE 1 MG/G
CREAM TOPICAL
COMMUNITY

## 2025-01-30 RX ORDER — VARENICLINE 0.03 MG/.05ML
1 SPRAY NASAL
COMMUNITY
Start: 2024-01-25

## 2025-01-30 RX ORDER — ELETRIPTAN HYDROBROMIDE 20 MG/1
20 TABLET, FILM COATED ORAL
COMMUNITY

## 2025-01-30 RX ORDER — ALBUTEROL SULFATE 90 UG/1
1-2 INHALANT RESPIRATORY (INHALATION)
Start: 2025-01-30

## 2025-01-30 RX ORDER — MEPERIDINE HYDROCHLORIDE 25 MG/ML
25 INJECTION INTRAMUSCULAR; INTRAVENOUS; SUBCUTANEOUS
OUTPATIENT
Start: 2025-01-30

## 2025-01-30 RX ORDER — PROMETHAZINE HYDROCHLORIDE 25 MG/1
25 TABLET ORAL
COMMUNITY

## 2025-01-30 RX ORDER — ROSUVASTATIN CALCIUM 10 MG/1
TABLET, COATED ORAL
COMMUNITY

## 2025-01-30 RX ORDER — DIPHENHYDRAMINE HYDROCHLORIDE 50 MG/ML
25 INJECTION INTRAMUSCULAR; INTRAVENOUS
Start: 2025-01-30

## 2025-01-30 RX ORDER — ESTRADIOL 0.1 MG/G
CREAM VAGINAL
COMMUNITY

## 2025-01-30 RX ORDER — ALBUTEROL SULFATE 0.83 MG/ML
2.5 SOLUTION RESPIRATORY (INHALATION)
OUTPATIENT
Start: 2025-01-30

## 2025-01-30 RX ORDER — LEVOTHYROXINE SODIUM 112 UG/1
TABLET ORAL
COMMUNITY

## 2025-01-30 RX ORDER — METHYLPREDNISOLONE SODIUM SUCCINATE 40 MG/ML
40 INJECTION INTRAMUSCULAR; INTRAVENOUS
Start: 2025-01-30

## 2025-01-30 RX ORDER — FLUOXETINE HYDROCHLORIDE 40 MG/1
40 CAPSULE ORAL
COMMUNITY

## 2025-01-30 RX ORDER — BUTALBITAL, ACETAMINOPHEN AND CAFFEINE 50; 325; 40 MG/1; MG/1; MG/1
1 TABLET ORAL
COMMUNITY

## 2025-01-30 RX ORDER — ZOLPIDEM TARTRATE 10 MG/1
10 TABLET ORAL
COMMUNITY

## 2025-01-30 RX ADMIN — IOHEXOL 4 ML: 350 INJECTION, SOLUTION INTRAVENOUS at 07:49

## 2025-01-30 ASSESSMENT — PAIN SCALES - GENERAL: PAINLEVEL_OUTOF10: NO PAIN (0)

## 2025-01-30 NOTE — PROGRESS NOTES
TRANSPLANT NEPHROLOGY DONOR EVALUATION    Recommend:  - 24h ABPM  - repeat litholink in 4-6 weeks (can't afford full dose k citrate, will be on 10 meq po bid+dietary modifications)  - obtain urology, gyn and workup for recurrent UTIs  - obtain dermatology records   - mammogram, pap, colonoscopy reports    Assessment and Plan:  # Prospective Kidney Transplant Donor: Patient with a lot of issues that need to be addressed prior to donation. Patient's blood pressure is elevated at this visit, kidney function appears to be acceptable with Iohexol pending, and urinalysis is abnormal.    # Elevated BP: repeat BP in clinic : Avg FE=442/83 (144/83, 146/75). Recommend 24h ABPM    # Hematuria: 5 rbcs on microscopic analysis, contaminated sample, prior hx of hematuria in the past, prior urologic evaluation negative per patient, obtain cystoscopy report. repeat UA negative    # hx of kidney stones: 1st episode was 15 years ago, required hospitalization but passed spontaneously, 2nd episode in 2021, no prior stone analysis, litholink showed hypocitraturia, repeatlitholink showed improvement. Litholink with slight improvement in hypo-citraturia but still below goal~321 (up from 290) on K citrate 10 meq po bid, increased to 20 meq po bid but concerned about the cost, discussed dietary modifications with dietitian. Repeat litholink in 4-6 weeks. Review CT for kidney stones    # Hypothyroidism: on synthroid, check TSH    # Hyperlipidemia: on statin    # Prediabetes: YzI0Q-0.8%, BMI-22.67. no family hx of DM.     # Recurrent UTIs:  1st 9 yrs ago, Riaz renal colic/stone without UTIs, recurred once a year between 8378-4071, evaluated by urology last summer :cystoscopy neg per patient, saw Gyn as well. Currently using cranberry, no recurrence over the last ~6 months     # hx of SCC: over the left shoulder, diagnosed in 2018, no recurrence, follows with dermatology. Obtain path report and dermatology records    # Mood changes: post  menopause controlled on prozac. Denies any hx of depression/anxiety. Appreciate social work input     # hx of alternating Diarrhea/Constipation: remote hx>10 years ago, prior evaluation by GI , per patient symptoms attributed to possible bacterial overgrowth managed with probiotics since, +norovirus in Nov 2024, currently intermittent bouts of diarrhea once a week following norovirus infection. No weight loss, abdominal pain. Counseled about the risks of ALICIA with recurrent bouts of diarrhea and the importance of hydration. Obtain recent colonoscopy report    # Family hx of CKD of unclear etiology: only in brother (potential recipient) who underwent a kidney biopsy as well as genetic testing both unrevealing    # Cardiac screening: Asymptomatic, No family hx of CAD. No smoking hx. Stress echocardiogram given age.     # Health maintenance: UTD mammogram, pap smear, colonoscopy (review records)     Discussed the risks of donating a kidney, including the surgical risk and the possible risks of living with one kidney.    Education about expected post-donation kidney function and how chronic kidney disease (CKD) and end stage kidney disease (ESKD) might potentially impact the donor in the future, include, but not limited to:       - On average, donors will have 25-35% permanent loss of kidney function at donation.       - Baseline risk of ESKD may slightly exceed that of members of the general        population with the same demographic profile.       - Donor risks must be interpreted in light of known epidemiology of both CKD or         ESKD, such as that CKD generally develops in midlife (40-50 years old) and ESKD         generally develops after age 60.       - Medical evaluation of young potential donors cannot predict lifetime risk of CKD or         ESKD.       - Donors may be at higher risk for CKD if they sustain damage to the remaining         kidney.       - Development of CKD and progression of ESKD may be more  rapid with only 1         kidney.       - Some type of kidney replacement therapy, either kidney transplant or dialysis, is         required when reaching ESKD.    Potential medical or surgical risks include, but not limited to:       - Death.       - Scars, pain, fatigue, and other consequences typical of any surgical procedure.       - Decreased kidney function.       - Abdominal or bowel symptoms, such as bloating and nausea, and developing         bowel obstruction.       - Kidney failure (ESKD) and the need for a kidney transplant or dialysis for the donor.       - Impact of obesity, hypertension, or other donor-specific medical conditions on         morbidity and mortality of the potential donor.    Patients overall evaluation will be discussed with the transplant team and a final recommendation on the patients' suitability to be a kidney transplant donor will be made at that time.    Consult:  Shaniqua Perez was seen in consultation at the request of Dr. Carlos Eduardo Marks for evaluation as a potential kidney transplant donor.    Reason for Visit:  Shaniqua Perez is a 69 year old female who presents for a kidney donor evaluation.  Patient would like to donate to her brother with CKD of unclear etiology .    Present Condition and Donor-Related Medical History:    Mrs. Perez is a 70 yo female with hx of kidney stones, pre-diabetes, hyperlipidemia, hypothyroidism, and SCC presents for kidney donor evaluation. She reports remote hx of kidney stones about once about ~15 yrs ago, another episode in 2021, both stones spontaneously passed though she required hospitalization with the 1st episode. No prior stone analysis. Litholink prior to donor evaluation showed hypo-citraturia, she started K citrate with some improvement in urine citrate level. She also reports hx of recurrent UTIs over the last 2 years, ~once /year and underwent urologic and gyn evaluation which was reportedly negative and has been maintain on cranberry  for prevention.  She is active; walks 3-4 miles/d, aerobic exercises a couple times a week, no exertional symptoms.         Kidney Disease Hx:       h/o Kidney Problems: No   Family h/o Genetic Kidney Disease: No brother potential recipient with CKD of unclear etiology with (genetic testing neg, biopsy unrevealing)       h/o Hypertension: No      Usual Blood Pressure:  ~110s per patient at PCP office       h/o Protein in Urine: No    h/o Blood in Urine: Yes tea colored urine, in the setting of renal colic, Ucx neg for UTI. Referred to GYN and urology       h/o Kidney Stones: Yes  X2 15 yrs ago and recurrent in 2021, no stone analysis, *litholink    h/o Kidney Injury: No       h/o Recurrent UTI: Yes:  once a year between 1484-2759, evaluation by urology and gyn neg per patient, on cranberry    h/o Genitourinary Problems: No       h/o Chronic NSAID Use: Yes intermittent NSAIDS use now stopped and using alternatives          Other Medical Hx:       h/o Diabetes: Yes gestational DM, then prediabetes           h/o Gastrointestinal, Pancreas or Liver Problems: No       h/o Lung or Heart Problems: No       h/o Hematologic Problems: No  h/o Bleeding or Clotting Problems: No       h/o Cancer: No       h/o Infection Problems: No       H/o Gestational DM: Yes      H/o Gestational HTN: No     H/o Preeclampsia: No 2 pregnancies         Skin Cancer Risk:       h/o more than 50 moles: No       h/o extensive sun exposure: Yes        h/o melanoma: No       Family h/o melanoma: No         Mental Health Assessment:       h/o Depression: No       h/o Psychiatric Illness: No       h/o Suicidal Attempt(s): No    Review Of Systems:   A comprehensive review of systems was obtained and negative, except as noted in the HPI or PMH.    Past Medical History:   History was taken from the patient as noted below.  Past Medical History:   Diagnosis Date    Arthritis     Fibromyalgia     Kidney stone     Neuropathy        Past Social History:   Past  Surgical History:   Procedure Laterality Date    ANKLE SURGERY      SHOULDER SURGERY       Personal or family history of anesthesia problems: No    Family History:   Family History   Problem Relation Age of Onset    Hypertension Mother     Heart Failure Mother     Kidney Disease Mother     Cancer Mother     Hypertension Father     Hypertension Brother     Kidney Disease Brother           Specific Family History in First Degree Relatives:       FH of Kidney Dz: Yes ESKD of unclear etiology, HTN  FH of Diabetes: No       FH of Hypertension: Yes  FH of CAD: No       FH of Cancer: Yes of undetermined primary   FH of Kidney Cancer: No    Personal History:   Social History     Socioeconomic History    Marital status:      Spouse name: Not on file    Number of children: Not on file    Years of education: Not on file    Highest education level: Not on file   Occupational History    Not on file   Tobacco Use    Smoking status: Never    Smokeless tobacco: Never   Substance and Sexual Activity    Alcohol use: Yes     Alcohol/week: 7.0 standard drinks of alcohol     Types: 7 Standard drinks or equivalent per week     Comment: 5-10 glasses    Drug use: Never    Sexual activity: Not on file   Other Topics Concern    Not on file   Social History Narrative    Not on file     Social Drivers of Health     Financial Resource Strain: Not on file   Food Insecurity: Not on file   Transportation Needs: Not on file   Physical Activity: Not on file   Stress: Not on file   Social Connections: Not on file   Interpersonal Safety: Not on file   Housing Stability: Not on file          Specific Social History:       Health Insurance Status: Yes        Living Arrangements: lives with their spouse       Social Support: Yes       Presence of increased risk for disease transmission behaviors as defined by PHS guidelines: No        Allergies:  No Known Allergies    Medications:  Current Outpatient Medications   Medication Sig Dispense Refill     "butalbital-acetaminophen-caffeine (ESGIC) -40 MG tablet Take 1 tablet by mouth.      docusate sodium (COLACE) 50 MG capsule Take 50 mg by mouth.      eletriptan (RELPAX) 20 MG tablet Take 20 mg by mouth.      estradiol (ESTRACE) 0.1 MG/GM vaginal cream _insert 0.5 mg vaginally Vaginal Twice a week. for 30 days      ezetimibe (ZETIA) 10 MG tablet Take 10 mg by mouth.      FLUoxetine (PROZAC) 40 MG capsule Take 40 mg by mouth.      fluticasone furoate 27.5 MCG/SPRAY nasal spray Spray 2 sprays in nostril.      levothyroxine (SYNTHROID/LEVOTHROID) 112 MCG tablet       POTASSIUM CITRATE ER PO       promethazine (PHENERGAN) 25 MG tablet Take 25 mg by mouth.      rosuvastatin (CRESTOR) 10 MG tablet       triamcinolone (KENALOG) 0.1 % external cream Apply topically.      varenicline (TYRVAYA) 0.03 MG/ACT nasal spray Spray 1 Application in nostril.      zolpidem (AMBIEN) 10 MG tablet Take 10 mg by mouth.       No current facility-administered medications for this visit.     There are no discontinued medications.      Vitals:      1/30/2025     7:40 AM 1/30/2025     9:30 AM 1/30/2025     9:33 AM   Vital Signs   Systolic  146 137   Diastolic  75 81   Pulse  64    Height 5' 6\" 5' 6.5\"        Exam:   GENERAL APPEARANCE: alert and no distress  HENT: mouth without ulcers or lesions  RESP: lungs clear to auscultation - no rales, rhonchi or wheezes  CV: regular rhythm, normal rate, no rub, no murmur  EDEMA: no LE edema bilaterally  ABDOMEN: soft, nondistended, nontender, bowel sounds normal  MS: extremities normal - no gross deformities noted, no evidence of inflammation in joints, no muscle tenderness  SKIN: no rash    Results:   Labs and imaging were ordered for this visit and reviewed by me.  Recent Results (from the past 2 weeks)   Protein  random urine    Collection Time: 01/30/25  7:23 AM   Result Value Ref Range    Total Protein Urine mg/dL 6.2   mg/dL    Total Protein Urine mg/mg Creat 0.09 0.00 - 0.20 mg/mg Cr    " Creatinine Urine mg/dL 66.6 mg/dL   Routine UA with microscopic    Collection Time: 01/30/25  7:23 AM   Result Value Ref Range    Color Urine Light Yellow Colorless, Straw, Light Yellow, Yellow    Appearance Urine Clear Clear    Glucose Urine Negative Negative mg/dL    Bilirubin Urine Negative Negative    Ketones Urine Negative Negative mg/dL    Specific Gravity Urine 1.009 1.003 - 1.035    Blood Urine Negative Negative    pH Urine 6.5 5.0 - 7.0    Protein Albumin Urine Negative Negative mg/dL    Urobilinogen Urine Normal Normal, 2.0 mg/dL    Nitrite Urine Negative Negative    Leukocyte Esterase Urine Large (A) Negative    RBC Urine 5 (H) <=2 /HPF    WBC Urine 4 <=5 /HPF    Squamous Epithelials Urine 7 (H) <=1 /HPF    Transitional Epithelials Urine <1 <=1 /HPF   CBC with platelets    Collection Time: 01/30/25  7:23 AM   Result Value Ref Range    WBC Count 5.7 4.0 - 11.0 10e3/uL    RBC Count 4.36 3.80 - 5.20 10e6/uL    Hemoglobin 13.9 11.7 - 15.7 g/dL    Hematocrit 41.1 35.0 - 47.0 %    MCV 94 78 - 100 fL    MCH 31.9 26.5 - 33.0 pg    MCHC 33.8 31.5 - 36.5 g/dL    RDW 13.9 10.0 - 15.0 %    Platelet Count 312 150 - 450 10e3/uL   Partial thromboplastin time    Collection Time: 01/30/25  7:23 AM   Result Value Ref Range    aPTT 26 22 - 38 Seconds   INR    Collection Time: 01/30/25  7:23 AM   Result Value Ref Range    INR 0.96 0.85 - 1.15   Phosphorus    Collection Time: 01/30/25  7:23 AM   Result Value Ref Range    Phosphorus 3.7 2.5 - 4.5 mg/dL   Uric acid    Collection Time: 01/30/25  7:23 AM   Result Value Ref Range    Uric Acid 5.2 2.4 - 5.7 mg/dL   Lipid Profile    Collection Time: 01/30/25  7:23 AM   Result Value Ref Range    Cholesterol 179 <200 mg/dL    Triglycerides 83 <150 mg/dL    Direct Measure HDL 97 >=50 mg/dL    LDL Cholesterol Calculated 65 <100 mg/dL    Non HDL Cholesterol 82 <130 mg/dL    Patient Fasting > 8hrs? Yes    Comprehensive metabolic panel    Collection Time: 01/30/25  7:23 AM   Result Value  Ref Range    Sodium 135 135 - 145 mmol/L    Potassium 3.9 3.4 - 5.3 mmol/L    Carbon Dioxide (CO2) 27 22 - 29 mmol/L    Anion Gap 8 7 - 15 mmol/L    Urea Nitrogen 9.6 8.0 - 23.0 mg/dL    Creatinine 0.71 0.51 - 0.95 mg/dL    GFR Estimate >90 >60 mL/min/1.73m2    Calcium 9.3 8.8 - 10.4 mg/dL    Chloride 100 98 - 107 mmol/L    Glucose 94 70 - 99 mg/dL    Alkaline Phosphatase 89 40 - 150 U/L    AST 33 0 - 45 U/L    ALT 22 0 - 50 U/L    Protein Total 7.2 6.4 - 8.3 g/dL    Albumin 4.5 3.5 - 5.2 g/dL    Bilirubin Total 0.4 <=1.2 mg/dL    Patient Fasting > 8hrs? Yes    Adult Type and Screen    Collection Time: 01/30/25  7:23 AM   Result Value Ref Range    SPECIMEN EXPIRATION DATE [id number]    ABO and Rh 2nd type and screen required    Collection Time: 01/30/25  7:46 AM   Result Value Ref Range    SPECIMEN EXPIRATION DATE [id number]

## 2025-01-30 NOTE — LETTER
1/30/2025      Shaniqua Perez  [address]  [place] [address]      Dear Colleague,    Thank you for referring your patient, Shaniqua Perez, to the [facility]. Please see a copy of my visit note below.    TRANSPLANT NEPHROLOGY DONOR EVALUATION    Recommend:  - 24h ABPM  - repeat litholink in 4-6 weeks (can't afford full dose k citrate, will be on 10 meq po bid+dietary modifications)  - obtain urology, gyn and workup for recurrent UTIs  - obtain dermatology records   - mammogram, pap, colonoscopy reports    Assessment and Plan:  # Prospective Kidney Transplant Donor: Patient with a lot of issues that need to be addressed prior to donation. Patient's blood pressure is elevated at this visit, kidney function appears to be acceptable with Iohexol pending, and urinalysis is abnormal.    # Elevated BP: repeat BP in clinic : Avg IF=034/83 (144/83, 146/75). Recommend 24h ABPM    # Hematuria: 5 rbcs on microscopic analysis, contaminated sample, prior hx of hematuria in the past, prior urologic evaluation negative per patient, obtain cystoscopy report. repeat UA negative    # hx of kidney stones: 1st episode was 15 years ago, required hospitalization but passed spontaneously, 2nd episode in 2021, no prior stone analysis, litholink showed hypocitraturia, repeatlitholink showed improvement. Litholink with slight improvement in hypo-citraturia but still below goal~321 (up from 290) on K citrate 10 meq po bid, increased to 20 meq po bid but concerned about the cost, discussed dietary modifications with dietitian. Repeat litholink in 4-6 weeks. Review CT for kidney stones    # Hypothyroidism: on synthroid, check TSH    # Hyperlipidemia: on statin    # Prediabetes: VgN9B-8.8%, BMI-22.67. no family hx of DM.     # Recurrent UTIs:  1st 9 yrs ago, Riaz renal colic/stone without UTIs, recurred once a year between 1825-5934, evaluated by urology last summer :cystoscopy neg per patient, saw Gyn as well. Currently using  cranberry, no recurrence over the last ~6 months     # hx of SCC: over the left shoulder, diagnosed in 2018, no recurrence, follows with dermatology. Obtain path report and dermatology records    # Mood changes: post menopause controlled on prozac. Denies any hx of depression/anxiety. Appreciate social work input     # hx of alternating Diarrhea/Constipation: remote hx>10 years ago, prior evaluation by GI , per patient symptoms attributed to possible bacterial overgrowth managed with probiotics since, +norovirus in Nov 2024, currently intermittent bouts of diarrhea once a week following norovirus infection. No weight loss, abdominal pain. Counseled about the risks of ALICIA with recurrent bouts of diarrhea and the importance of hydration. Obtain recent colonoscopy report    # Family hx of CKD of unclear etiology: only in brother (potential recipient) who underwent a kidney biopsy as well as genetic testing both unrevealing    # Cardiac screening: Asymptomatic, No family hx of CAD. No smoking hx. Stress echocardiogram given age.     # Health maintenance: UTD mammogram, pap smear, colonoscopy (review records)     Discussed the risks of donating a kidney, including the surgical risk and the possible risks of living with one kidney.    Education about expected post-donation kidney function and how chronic kidney disease (CKD) and end stage kidney disease (ESKD) might potentially impact the donor in the future, include, but not limited to:       - On average, donors will have 25-35% permanent loss of kidney function at donation.       - Baseline risk of ESKD may slightly exceed that of members of the general        population with the same demographic profile.       - Donor risks must be interpreted in light of known epidemiology of both CKD or         ESKD, such as that CKD generally develops in midlife (40-50 years old) and ESKD         generally develops after age 60.       - Medical evaluation of young potential donors  cannot predict lifetime risk of CKD or         ESKD.       - Donors may be at higher risk for CKD if they sustain damage to the remaining         kidney.       - Development of CKD and progression of ESKD may be more rapid with only 1         kidney.       - Some type of kidney replacement therapy, either kidney transplant or dialysis, is         required when reaching ESKD.    Potential medical or surgical risks include, but not limited to:       - Death.       - Scars, pain, fatigue, and other consequences typical of any surgical procedure.       - Decreased kidney function.       - Abdominal or bowel symptoms, such as bloating and nausea, and developing         bowel obstruction.       - Kidney failure (ESKD) and the need for a kidney transplant or dialysis for the donor.       - Impact of obesity, hypertension, or other donor-specific medical conditions on         morbidity and mortality of the potential donor.    Patients overall evaluation will be discussed with the transplant team and a final recommendation on the patients' suitability to be a kidney transplant donor will be made at that time.    Consult:  Shaniqua Perez was seen in consultation at the request of Dr. Carlos Eduardo Marks for evaluation as a potential kidney transplant donor.    Reason for Visit:  Shaniqua Perez is a 69 year old female who presents for a kidney donor evaluation.  Patient would like to donate to her brother with CKD of unclear etiology .    Present Condition and Donor-Related Medical History:    Mrs. Perez is a 70 yo female with hx of kidney stones, pre-diabetes, hyperlipidemia, hypothyroidism, and SCC presents for kidney donor evaluation. She reports remote hx of kidney stones about once about ~15 yrs ago, another episode in 2021, both stones spontaneously passed though she required hospitalization with the 1st episode. No prior stone analysis. Litholink prior to donor evaluation showed hypo-citraturia, she started K citrate with some  improvement in urine citrate level. She also reports hx of recurrent UTIs over the last 2 years, ~once /year and underwent urologic and gyn evaluation which was reportedly negative and has been maintain on cranberry for prevention.  She is active; walks 3-4 miles/d, aerobic exercises a couple times a week, no exertional symptoms.         Kidney Disease Hx:       h/o Kidney Problems: No   Family h/o Genetic Kidney Disease: No brother potential recipient with CKD of unclear etiology with (genetic testing neg, biopsy unrevealing)       h/o Hypertension: No      Usual Blood Pressure:  ~110s per patient at PCP office       h/o Protein in Urine: No    h/o Blood in Urine: Yes tea colored urine, in the setting of renal colic, Ucx neg for UTI. Referred to GYN and urology       h/o Kidney Stones: Yes  X2 15 yrs ago and recurrent in 2021, no stone analysis, *litholink    h/o Kidney Injury: No       h/o Recurrent UTI: Yes:  once a year between 5113-5841, evaluation by urology and gyn neg per patient, on cranberry    h/o Genitourinary Problems: No       h/o Chronic NSAID Use: Yes intermittent NSAIDS use now stopped and using alternatives          Other Medical Hx:       h/o Diabetes: Yes gestational DM, then prediabetes           h/o Gastrointestinal, Pancreas or Liver Problems: No       h/o Lung or Heart Problems: No       h/o Hematologic Problems: No  h/o Bleeding or Clotting Problems: No       h/o Cancer: No       h/o Infection Problems: No       H/o Gestational DM: Yes      H/o Gestational HTN: No     H/o Preeclampsia: No 2 pregnancies         Skin Cancer Risk:       h/o more than 50 moles: No       h/o extensive sun exposure: Yes        h/o melanoma: No       Family h/o melanoma: No         Mental Health Assessment:       h/o Depression: No       h/o Psychiatric Illness: No       h/o Suicidal Attempt(s): No    Review Of Systems:   A comprehensive review of systems was obtained and negative, except as noted in the HPI or  PM.    Past Medical History:   History was taken from the patient as noted below.  Past Medical History:   Diagnosis Date     Arthritis      Fibromyalgia      Kidney stone      Neuropathy        Past Social History:   Past Surgical History:   Procedure Laterality Date     ANKLE SURGERY       SHOULDER SURGERY       Personal or family history of anesthesia problems: No    Family History:   Family History   Problem Relation Age of Onset     Hypertension Mother      Heart Failure Mother      Kidney Disease Mother      Cancer Mother      Hypertension Father      Hypertension Brother      Kidney Disease Brother           Specific Family History in First Degree Relatives:       FH of Kidney Dz: Yes ESKD of unclear etiology, HTN  FH of Diabetes: No       FH of Hypertension: Yes  FH of CAD: No       FH of Cancer: Yes of undetermined primary   FH of Kidney Cancer: No    Personal History:   Social History     Socioeconomic History     Marital status:      Spouse name: Not on file     Number of children: Not on file     Years of education: Not on file     Highest education level: Not on file   Occupational History     Not on file   Tobacco Use     Smoking status: Never     Smokeless tobacco: Never   Substance and Sexual Activity     Alcohol use: Yes     Alcohol/week: 7.0 standard drinks of alcohol     Types: 7 Standard drinks or equivalent per week     Comment: 5-10 glasses     Drug use: Never     Sexual activity: Not on file   Other Topics Concern     Not on file   Social History Narrative     Not on file     Social Drivers of Health     Financial Resource Strain: Not on file   Food Insecurity: Not on file   Transportation Needs: Not on file   Physical Activity: Not on file   Stress: Not on file   Social Connections: Not on file   Interpersonal Safety: Not on file   Housing Stability: Not on file          Specific Social History:       Health Insurance Status: Yes        Living Arrangements: lives with their spouse      "  Social Support: Yes       Presence of increased risk for disease transmission behaviors as defined by [facility] guidelines: No        Allergies:  No Known Allergies    Medications:  Current Outpatient Medications   Medication Sig Dispense Refill     butalbital-acetaminophen-caffeine (ESGIC) -40 MG tablet Take 1 tablet by mouth.       docusate sodium (COLACE) 50 MG capsule Take 50 mg by mouth.       eletriptan (RELPAX) 20 MG tablet Take 20 mg by mouth.       estradiol (ESTRACE) 0.1 MG/GM vaginal cream _insert 0.5 mg vaginally Vaginal Twice a week. for 30 days       ezetimibe (ZETIA) 10 MG tablet Take 10 mg by mouth.       FLUoxetine (PROZAC) 40 MG capsule Take 40 mg by mouth.       fluticasone furoate 27.5 MCG/SPRAY nasal spray Spray 2 sprays in nostril.       levothyroxine (SYNTHROID/LEVOTHROID) 112 MCG tablet        POTASSIUM CITRATE ER PO        promethazine (PHENERGAN) 25 MG tablet Take 25 mg by mouth.       rosuvastatin (CRESTOR) 10 MG tablet        triamcinolone (KENALOG) 0.1 % external cream Apply topically.       varenicline (TYRVAYA) 0.03 MG/ACT nasal spray Spray 1 Application in nostril.       zolpidem (AMBIEN) 10 MG tablet Take 10 mg by mouth.       No current facility-administered medications for this visit.     There are no discontinued medications.      Vitals:      1/30/2025     7:40 AM 1/30/2025     9:30 AM 1/30/2025     9:33 AM   Vital Signs   Systolic  146 137   Diastolic  75 81   Pulse  64    Height 5' 6\" 5' 6.5\"        Exam:   GENERAL APPEARANCE: alert and no distress  HENT: mouth without ulcers or lesions  RESP: lungs clear to auscultation - no rales, rhonchi or wheezes  CV: regular rhythm, normal rate, no rub, no murmur  EDEMA: no LE edema bilaterally  ABDOMEN: soft, nondistended, nontender, bowel sounds normal  MS: extremities normal - no gross deformities noted, no evidence of inflammation in joints, no muscle tenderness  SKIN: no rash    Results:   Labs and imaging were ordered for this " visit and reviewed by me.  Recent Results (from the past 2 weeks)   Protein  random urine    Collection Time: 01/30/25  7:23 AM   Result Value Ref Range    Total Protein Urine mg/dL 6.2   mg/dL    Total Protein Urine mg/mg Creat 0.09 0.00 - 0.20 mg/mg Cr    Creatinine Urine mg/dL 66.6 mg/dL   Routine UA with microscopic    Collection Time: 01/30/25  7:23 AM   Result Value Ref Range    Color Urine Light Yellow Colorless, Straw, Light Yellow, Yellow    Appearance Urine Clear Clear    Glucose Urine Negative Negative mg/dL    Bilirubin Urine Negative Negative    Ketones Urine Negative Negative mg/dL    Specific Gravity Urine 1.009 1.003 - 1.035    Blood Urine Negative Negative    pH Urine 6.5 5.0 - 7.0    Protein Albumin Urine Negative Negative mg/dL    Urobilinogen Urine Normal Normal, 2.0 mg/dL    Nitrite Urine Negative Negative    Leukocyte Esterase Urine Large (A) Negative    RBC Urine 5 (H) <=2 /HPF    WBC Urine 4 <=5 /HPF    Squamous Epithelials Urine 7 (H) <=1 /HPF    Transitional Epithelials Urine <1 <=1 /HPF   CBC with platelets    Collection Time: 01/30/25  7:23 AM   Result Value Ref Range    WBC Count 5.7 4.0 - 11.0 10e3/uL    RBC Count 4.36 3.80 - 5.20 10e6/uL    Hemoglobin 13.9 11.7 - 15.7 g/dL    Hematocrit 41.1 35.0 - 47.0 %    MCV 94 78 - 100 fL    MCH 31.9 26.5 - 33.0 pg    MCHC 33.8 31.5 - 36.5 g/dL    RDW 13.9 10.0 - 15.0 %    Platelet Count 312 150 - 450 10e3/uL   Partial thromboplastin time    Collection Time: 01/30/25  7:23 AM   Result Value Ref Range    aPTT 26 22 - 38 Seconds   INR    Collection Time: 01/30/25  7:23 AM   Result Value Ref Range    INR 0.96 0.85 - 1.15   Phosphorus    Collection Time: 01/30/25  7:23 AM   Result Value Ref Range    Phosphorus 3.7 2.5 - 4.5 mg/dL   Uric acid    Collection Time: 01/30/25  7:23 AM   Result Value Ref Range    Uric Acid 5.2 2.4 - 5.7 mg/dL   Lipid Profile    Collection Time: 01/30/25  7:23 AM   Result Value Ref Range    Cholesterol 179 <200 mg/dL     Triglycerides 83 <150 mg/dL    Direct Measure HDL 97 >=50 mg/dL    LDL Cholesterol Calculated 65 <100 mg/dL    Non HDL Cholesterol 82 <130 mg/dL    Patient Fasting > 8hrs? Yes    Comprehensive metabolic panel    Collection Time: 01/30/25  7:23 AM   Result Value Ref Range    Sodium 135 135 - 145 mmol/L    Potassium 3.9 3.4 - 5.3 mmol/L    Carbon Dioxide (CO2) 27 22 - 29 mmol/L    Anion Gap 8 7 - 15 mmol/L    Urea Nitrogen 9.6 8.0 - 23.0 mg/dL    Creatinine 0.71 0.51 - 0.95 mg/dL    GFR Estimate >90 >60 mL/min/1.73m2    Calcium 9.3 8.8 - 10.4 mg/dL    Chloride 100 98 - 107 mmol/L    Glucose 94 70 - 99 mg/dL    Alkaline Phosphatase 89 40 - 150 U/L    AST 33 0 - 45 U/L    ALT 22 0 - 50 U/L    Protein Total 7.2 6.4 - 8.3 g/dL    Albumin 4.5 3.5 - 5.2 g/dL    Bilirubin Total 0.4 <=1.2 mg/dL    Patient Fasting > 8hrs? Yes    Adult Type and Screen    Collection Time: 01/30/25  7:23 AM   Result Value Ref Range    SPECIMEN EXPIRATION DATE [id number]    ABO and Rh 2nd type and screen required    Collection Time: 01/30/25  7:46 AM   Result Value Ref Range    SPECIMEN EXPIRATION DATE [id number]            Again, thank you for allowing me to participate in the care of your patient.        Sincerely,        Chon Meadows MD    Electronically signed

## 2025-01-30 NOTE — PROGRESS NOTES
Living Kidney Donor Consent per OPTN Policy 14.2 for Independent Living Donor Advocate (ANGELIKA)    Organ Type: living kidney donor  Presenting Information: Shaniqua presents to [facility], [facility] to complete a living donor evaluation since she is interested in becoming a living kidney donor for her brother, who was present in lobby.      Written assurance has been obtained from the potential donor that he/she:   Is willing to donate  Is free from inducement and coercion  Has been informed that the he/she may decline to donate at any time  Has been informed that transplant centers must:   A) Offer donors an opportunity to discontinue the donor consent or evaluation process in a way that is protected and confidential  B) Provide an independent living donor advocate (ANGELIKA) to assist the potential donor during this process    The following was presented to the potential donor in a language in which the potential donor is able to engage in meaningful dialogue:   Education and instruction about all phases of the living donation process including:   Consent  Medical and psychosocial evaluation  Information about the surgical procedure  Pre and post operative care  Benefits of post operative follow up  Disclosure that the recovery hospital will take all reasonable precautions to provide confidentiality for the donor/recipient  Disclosure that it is a federal crime for any person to knowingly acquire, obtain or otherwise transfer any human organ for valuable consideration  Disclosure that the recovery hospital must provide an independent living donor advocate (ANGELIKA)  Disclosure that health information obtained during the evaluation is subject to the same regulations as all records and could reveal conditions that must be reported to local, state, or federal public health authorities  Disclosure that the recovery hospital is required to report living donor  follow up information at 6 months, 1 year, and 2 years, and that the potential donor must commit to post operative follow up testing coordinated by the [facility]    Disclosure has been provided that these risks may be transient or permanent & include but are not limited to:  Potential psychosocial risks:  Problems with body image  Post-surgery depression or anxiety  Feelings of emotional distress or bereavement if recipient experiences any recurrent disease or in the event of the recipient s death  Impact of donation on the donor s lifestyle, such as limited ability to exercise in the short term post operative recovery period, no driving for the first 2 weeks post op or until the donor is no longer needing pain medications that impair the ability to drive.      Potential financial impacts:  Personal expenses of travel, housing, , lost wages related to donation might not be reimbursed. However, resources may be available to defray some donation-related expenses.   Need for life-long follow up at the donor s expense  Loss of employment or income  Negative impact on the ability to obtain future employment  Negative impact on the ability to obtain, maintain, or afford health, disability, and life insurance  Future health problems experienced by living donors following donation may not be covered by the recipient s insurance      PREPARATION FOR DONATION, RECOVERY, AND POTENTIAL SHORT-LONG-TERM OUTCOMES:  Understanding of the Living Donation Process:  We discussed the role of Independent Living Donor Advocate.  Short and long term medical and psychosocial risks to both, donor and recipient were reviewed and she expressed understanding.  Post surgical restrictions (2 weeks no driving, 6 weeks no lifting over 10 lbs) were reviewed and she appears capable of adhering to the post surgical requirements. The need for a caregiver was discussed and she has support brother's family and . The risk of poor  psychosocial outcome including problems with body image, post-surgery depression or anxiety, or feelings of emotional distress or bereavement if recipient experiences any recurrent disease, poor outcome or death was reviewed.  Additionally, potential financial implications, including the risk of having difficulty obtaining health care insurance, life insurance, disability insurance, or long term care insurance were reviewed, as were available donor grants to assist with donor related expenses.      IMPRESSIONS/RECOMMENDATIONS:  Shaniqua appears highly motivated to donate a kidney to to her brother.  She appears capable of understanding this information and making an informed medical decision.  As ANGELIKA, no concerns were identified today. She has my contact information and is aware that I am available thoughout the donation process.    Contact Information:  Virginia Benz [facility], [facility]   Independent Living Donor Advocate  [facility], [facility], [facility]  Direct: [phone]  E-Mail: [email or web address]      Time Spent: 20 minutes

## 2025-01-30 NOTE — PROGRESS NOTES
"Donor Iohexol test    Shaniqua Perez presents today to [facility] for a Donor Iohexol test.      Progress note:  ID verified by name and .     The following information was verified with the patient:  Female Patients is there any possibility of being pregnant No  Is there a history of allergy (skin rash, swelling, ect) to:   A.  Iodine (except skin reactions to betadine): No   B. Intravenous radio-contrast agents: No   C. Seafood No     present during visit today: Not Applicable.    R.N. provided patient with educational handout regarding timed test. Yes    23 G placed in Right antecubital at [facility], then blood drawn and Iohexol administered over 2 minutes.  Positive blood return verified before and after injection. Right antecubital PIV removed.  20 gauge PIV placed in lab for blood draws and CT this afternoon.    Medication administered:  Iohexol (Omnipaque 350mg iodine/ml concentration) 4mls.    Start time: 0751 am  Stop time: 0753 am      Administrations This Visit       iohexol (OMNIPAQUE) 350 MG/ML injectable solution 4 mL       Admin Date  2025 Action  $Given Dose  4 mL Route  Intravenous Documented By  Kinsey Lawrence, RN              sodium chloride (PF) 0.9% PF flush 5 mL       Admin Date  2025 Action  $Given Dose  5 mL Route  Intracatheter Documented By  Kinsey Lawrence, NADJA                        Evaluation nurse in transplant to draw labs at 2 and 4 hours post iohexol administration.  Patient given a slip with the times to get labs drawn and verbalized understanding of the plan.    Patient tolerated the procedure:  Yes    After the infusion patient was discharged to the next appointment.      /68 (BP Location: Right arm, Patient Position: Sitting, Cuff Size: Adult Regular)   Pulse 74   Temp 97.5  F (36.4  C) (Tympanic)   Resp 18   Ht 1.676 m (5' 6\")   Wt 64.7 kg (142 lb 9.6 oz)   SpO2 98%   BMI 23.02 kg/m       Kinsey Lawrence RN      "

## 2025-01-30 NOTE — PROGRESS NOTES
[facility] SOLID ORGAN TRANSPLANT  OUTPATIENT MNT: KIDNEY DONOR EVALUATION     Current BMI: 22.6 (HT 66.5 in,  lbs/65 kg)    8 Year Estimated Risk of T2DM  </= 3%     TIME SPENT: 15 minutes  VISIT TYPE: Initial  REFERRING PHYSICIAN: Selina   PT ACCOMPANIED BY: her brother, John (intended recipient)    NUTRITION ASSESSMENT  Cely Corcoran (10/2024)  - ur volume low: 1760 ml  - ur citrate low: 290 (goal >550)    Cely Corcoran (1/2025)  - ur volume OK: 3070 ml (reports drinking 3 L fluid- measured out)  - ur citrate low: 321 (goal >550)  *Dr Donavan Roberts recommended increase K citrate  *Pt had tried 4 oz lemon juice diluted in her water, but caused GERD sx  *Is wondering if she can do 2 K citrate (instead of 4) + 2 oz lemon juice to tackle citrate from both ends due to cost of K citrate     H/o kidney stones: 1 instance 15 yrs ago; most recent 2 yrs ago   Parental h/o DM: no  H/o GDM or HTN in pregnancy (if applicable): GDM     Vitamins, Supplements, Pertinent Meds: vit C for UTI per Urologist (new since summer, unsure of dose, yet pt reports her Urologist knows she had kidney stones), D- mannose (?) for UTI   Herbal Medicines/Supplements: none   Protein Supplements: no     Weight hx: wt stable   Current or past h/o weight loss medications (wegovy, ozempic, phentermine, etc): >10 years ago     PHYSICAL ACTIVITY   Walks 3-4 miles/day   Aerobics class 2x/week     DIET RECALL  Breakfast 10 am- Greek yogurt    Lunch Cheese stick or banana    Dinner Chicken, salads/veggies/stir mercedes, minimal red meat   Snacks More chips lately   Beverages Water (3 L/day since October), 1-2 pop (previously would have 6 Diet 7up/day), coffee x 2    Alcohol 10 drinks/week (champagne)   Dining out 1x/week      LABS  Recent Labs   Lab Test 01/30/25  0723   CHOL 179   HDL 97   LDL 65   TRIG 83       FBG = 94  A1c = 5.8 (stable since 8/2024)  BP = 122/68, 146/75, 137/81     Prediction of Incident Diabetes Mellitus in Middle-aged Adults: The  "[place] Offspring Study  Alcon Faust MD; James B. Meigs, MD, MPH; Jocelyne Cadena, PhD; Zoya Gamez MD, MPH; Kalyan Justice MD; Sixto Ghosh Sr,   PhD  Pt's estimated risk for T2DM (per Table 6 above)  Pt received points for the following criteria: high BP today   Total points: 2  8-Year estimated risk of T2DM: </= 3%    NUTRITION DIAGNOSIS  No nutrition diagnosis identified at this time.    NUTRITION INTERVENTION  Nutrition education provided:  Reviewed overall healthy diet guidelines for pre and post kidney donation. Discussed that there are no specific \"kidney donor\" diets. Encouraged routine medical follow up post donation (ie annual physicals) to monitor BP and BG levels. If these things change/worsen in the future post donation, we reviewed how diet and lifestyle modifications can help mitigate risk factors for DM & HTN. Discussed maintenance of a healthy weight and Na+ intake <3000 mg/day (<2000 mg/day if HTN). Encouraged adequate hydration >60 oz/day of water post donation.     Avoid the following post op d/t unknown effects on the organs:  - Herbal, Chinese, holistic, chiropractic, natural, alternative medicines and supplements  - Detoxes and cleanses  - Weight loss pills  - Protein powders or other products with extracts or herbs (ie green tea extract)    Patient Understanding: Pt verbalized understanding of education provided.  Expected Engagement: Good  Follow-Up Plans: PRN     NUTRITION GOALS  No nutrition goals identified at this time     Phyllis Norman, RD, LD, CCTD                                "

## 2025-01-30 NOTE — PROGRESS NOTES
Psychosocial Evaluation  Living Organ Donation per OPTN Policy 14.1.A  Organ Type: Kidney  Presenting Information:  Shaniqua presents to the [facility] Transplant [facility] to complete a psychosocial evaluation since she is interested in becoming a kidney donor for her brother.    PERSONAL BACKGROUND:  Current Living Situation: Shaniqua lives in [place] with her .    Education/Employment/Financial Situation: Shaniqua is retired, with 2 year degree and used to work as a dental assistant and . SW reviewed donor shield benefits. Shaniqua states she does not have any financial concerns related to donation.    Health Insurance Status: Shaniqua has a medicare advantage plan, up to date on health maintenance, has a primary care doctor and has completed health care directive.    Family History: Shaniqua is , two adult sons, two brothers. The brother she hopes to donate to and his wife live in [place].    General Health: No other significant past medical history or family history.    Mental Health: Shaniqua endorses depression and takes Fluoxetine since age 37, states it helps and has no concerns about her mental health. She states she has done therapy in the past but not currently. The donor denies any past or present treatment for mental health issues, such as anxiety, bipolar disorder, or disorders of thought such as schizophrenia or schizoaffective disorder. There is no history of personality disorder or eating disorders. The donor denies any need to see a counselor or therapist at this time. The donor denies any past suicidal ideation, plans, or past attempts. The donor denies any past history of hospitalization for psychiatric illnesses. The donor denies any past history of ADHD or ADD. The donor denies any history of educational issues or need for special educational services in their past history.    PHQ9: 0  GAD7: 0    Alcohol and Drug Use/Abuse/Dependency:  "Shaniqua reports that she consumes approximately 10 servings of alcohol per week ( a serving is defined here as one, 12 oz beer, or one 4 oz glass of wine, or one 1 /2 oz of hard liquor).  The donor denies any past history of abuse or dependency on alcohol or illicit drugs. The donor denies any current use of street drugs, including marijuana, vaping, edible marijuana, or other mood altering substances.  The donor denies any past history of negative consequences of use of alcohol or drugs such as a DUI, relationship problems, problems with fulfilling parenting or other care giving responsibilities, or problems with work performance.       Cigarette Use: none    Legal: none    Coping with major surgery/associated stress: regular 3 mile walks with friends, reading    Support System:     DONOR SPECIFIC INFORMATION:  Relationship to Recipient: sibling    Decision Process/Motivation to Donate: felt the decision to donate was \"automatic\" and something she very much wants to do for him.    High risk behaviors as defined by [place] Public Health Services (PHS) that have potential to increase risk of disease transmission were reviewed and no risk identified.  Living Donor Risk Criteria     Person who has had sex (including vaginal, anal, and oral) with a person known or suspected to have HIV, HBV, or HCV infections in the preceding 30 days. No    Men who have had sex with men in the preceding 30 days  No    Person who has had sex in exchange for money or drugs in the preceding 30 days No    Person who has had sex with a person who has sex in exchange for money or drugs in the preceding 30 days No    Person who has injected drugs for non-medical reasons in the preceding 30 days No    Person who has had sex with a person that has injected drugs for non-medical reasons in the preceding 30 days No    Person who has been incarcerated (confined in detention, snf, or juvenile correctional facility) for greater than/equal to 72 " consecutive hours in the preceding 30 days No    Child who has been  within the preceding 30 days by a mother with HIV infection N/A    Child born in the preceding 30 days to a mother known to be infected with HIV, HBV, or HCV N/A    Person with an unknown medical or social history for the preceding 30 days (a Donor Risk Assessment Interview - DRAI - cannot be obtained or risk factors cannot be determined) N/A       PREPARATION FOR DONATION, RECOVERY, AND POTENTIAL SHORT-LONG-TERM OUTCOMES:  Understanding of the Living Donation Process:  We discussed the role of living donor . Short and long term medical and psychosocial risks to both, donor and recipient were reviewed and she expressed understanding.  Post surgical restrictions (2 weeks no driving, 6 weeks no lifting over 10 lbs) were reviewed and she appears capable of adhering to the post surgical requirements. The need for a caregiver was discussed and has appropriate caregiver support. The risk of poor psychosocial outcome including problems with body image, post-surgery depression or anxiety, or feelings of emotional distress or bereavement if recipient experiences any recurrent disease, poor outcome or death was reviewed.  Additionally, potential financial implications, including the risk of having difficulty obtaining health care insurance, life insurance, disability insurance, or long term care insurance were reviewed, as were available donor grants to assist with donor related expenses.      We also discussed some unique issues that arise with paired kidney donation, which include the uncertainty of the timing and the importance of having a employment situation and support system that is able to provide sustained support and flexibility.    Shaniqua appears capable of understanding this information and making an informed medical decision.    Impressions/Recommendations:   Shaniqua is highly motivated to donate kidney to her brother. Her  decision to donate is free of inducement, coercion, or other undue pressure. Her housing, finances and employment are stable. No current/active mental health or chemical abuse issues were identified. The need for a caregiver was reviewed and she is able to identify a plan to meet her post operative care needs. She appears capable of making an informed medical decision. No psychosocial contraindications to living organ donation were identified and  I support Shaniqua s desire to donate a kidney to her brother.         Contact Information:   CALVIN Mcmahan, [facility]  Living Donor   Phone: [phone]  Pager: [phone]  [email or web address]      Time Spent: 20 minutes

## 2025-01-30 NOTE — LETTER
1/30/2025      Shaniqua Perez  [address]  [place] [address]      Dear Colleague,    Thank you for referring your patient, Shaniqua Perez, to the [facility]. Please see a copy of my visit note below.        Transplant Surgery Consult Note    Medical record number: [id number]  YOB: 1955,   Consult requested by the patient for evaluation of kidney donation candidacy.    Assessment and Recommendations: Ms. Perez appears to be a good candidate for kidney donation at this point in the evaluation. The following issues will need to be addressed prior to formal review:    CT abdomen and pelvis with contrast to be ordered for assessment vascular anatomy: Yes  Dietician consult ordered: Yes  Social work consult ordered: Yes  CXR and EKG ordered:  Yes  Transplant donor labs ordered to include HLA, ABOx2, Cr, Iohexol GFR or Cr clearance, virology etc.    The majority of our visit today was spent in counseling regarding the medical and surgical risks of kidney donation; the typical joyce-and post-operative experience and recovery/return to work pattern; restrictions related to the surgery (driving; lifting; exercise).         We also talked about post-op visits and longer term health care maintenance, as well as the implications of having one remaining kidney. This discussion included, but was not limited to rates of complications such as bleeding, infection, need for transfusion, reoperation, pneumonia, other organ injury, future bowel obstruction, incisional hernia, port site pain, varicocele, venous thrombosis, pulmonary embolism, renal failure, and death (3 per 10,000).            We discussed long-term risks in detail.  I discussed the articles suggesting a small increase in ESKD in donors and their limitations.        We discussed recovery, including length of hospital stay; no new meds other than pain meds on discharge; limitations after surgery (no driving for a couple of weeks; no  lifting over 10 lbs or exercise stretching abdominal muscles for 6 weeks; return to work [he lifts as part of his job]; and fatigue for the first few weeks postdonation.  I informed him/her of our donor survey results on donors feeling ready to drive, and on return to feeling back to normal.  We also discussed the need for maintaining a healthy lifestyle long-term after donation    We discussed the national paired system and the possibility of participating, if not a match for the intended recipient.  I explained how the system worked.          We discussed the national paired system and the possibility of participating, if not a match for the intended recipient.  I explained how the system worked.        We discussed the increased risk for venous thrombosis for the 1st two postoperative risks.  We discussed that if the family were to drive home in the 1st 2 weeks, the  should stop approximately every 40 minutes and she/he should get out of the car and walk around for a couple of minutes.                At the conclusion of the visit, all questions had been answered.  I explained that her candidacy for donation would be reviewed at our selection Committee next week, and that she would subsequently be contacted by her coordinator.  I recommended that he/she call his/her coordinator if there any additional questions at the end of the evaluation process; and that we would be glad to spend time discussing any concerns.  T  40 min spent on the date of the encounter in chart review, patient visit,  documentation and/or discussion with other providers about the issues documented above.        Carlos Eduardo Marks MD  Surgical Professor, Kidney Transplantation                                                                                                    ---------------------------------------------------------------------------------------------------    HPI: Shaniqua Perez is a 69 year old year old female who presents for  a kidney donor evaluation.        Personal history of:   No    Yes  Cancer:    []      []  Comment:     Diabetes   []      []  Comment:    Thombosis   []      []  Comment:       Hepatitis   []      []  Comment:    Tuberculosis   []      []  Comment:   Back or neck pain:  []      []  Comment:      Kidney stones   []      []  Comment:                  Kidney infections  []      []  Comment:           Urinary retention  []      []    Comment:   Regular NSAID use:  []      []     Comment:      Constipation:   []      []        Comment:      Cheondoism  []      []       Comment:      Other:    []      []       Comment:         Past Medical History:   Diagnosis Date     Arthritis      Fibromyalgia      Kidney stone      Neuropathy      Past Surgical History:   Procedure Laterality Date     ANKLE SURGERY       SHOULDER SURGERY       Family History   Problem Relation Age of Onset     Hypertension Mother      Heart Failure Mother      Kidney Disease Mother      Cancer Mother      Hypertension Father      Hypertension Brother      Kidney Disease Brother      Social History     Socioeconomic History     Marital status:      Spouse name: Not on file     Number of children: Not on file     Years of education: Not on file     Highest education level: Not on file   Occupational History     Not on file   Tobacco Use     Smoking status: Never     Smokeless tobacco: Never   Substance and Sexual Activity     Alcohol use: Yes     Alcohol/week: 7.0 standard drinks of alcohol     Types: 7 Standard drinks or equivalent per week     Comment: 5-10 glasses     Drug use: Never     Sexual activity: Not on file   Other Topics Concern     Not on file   Social History Narrative     Not on file     Social Drivers of Health     Financial Resource Strain: Not on file   Food Insecurity: Not on file   Transportation Needs: Not on file   Physical Activity: Not on file   Stress: Not on file   Social Connections: Not on file   Interpersonal  Safety: Not on file   Housing Stability: Not on file       ROS:   CONSTITUTIONAL:  No fevers or chills  EYES: negative for icterus  ENT:  negative for hearing loss, tinnitus and sore throat  RESPIRATORY:  negative for cough, sputum, dyspnea  CARDIOVASCULAR:  negative for chest pain  GASTROINTESTINAL:  negative for nausea, vomiting, diarrhea or constipation  GENITOURINARY:  negative for incontinence, dysuria, bladder emptying problems  HEME:  No easy bruising  INTEGUMENT:  negative for rash and pruritus  NEURO:  Negative for headache, seizure disorder    Allergies:   No Known Allergies    Medications:  Prescription Medications as of 2/4/2025         Rx Number Disp Refills Start End Last Dispensed Date Next Fill Date Owning Pharmacy    butalbital-acetaminophen-caffeine (ESGIC) -40 MG tablet  -- --  --       Sig: Take 1 tablet by mouth.    Class: Historical    Route: Oral    docusate sodium (COLACE) 50 MG capsule  -- --  --       Sig: Take 50 mg by mouth.    Class: Historical    Route: Oral    eletriptan (RELPAX) 20 MG tablet  -- --  --       Sig: Take 20 mg by mouth.    Class: Historical    Route: Oral    estradiol (ESTRACE) 0.1 MG/GM vaginal cream  -- --  --       Sig: _insert 0.5 mg vaginally Vaginal Twice a week. for 30 days    Class: Historical    ezetimibe (ZETIA) 10 MG tablet  -- --  --       Sig: Take 10 mg by mouth.    Class: Historical    Route: Oral    FLUoxetine (PROZAC) 40 MG capsule  -- --  --       Sig: Take 40 mg by mouth.    Class: Historical    Route: Oral    fluticasone furoate 27.5 MCG/SPRAY nasal spray  -- --  --       Sig: Spray 2 sprays in nostril.    Class: Historical    Route: Nasal    levothyroxine (SYNTHROID/LEVOTHROID) 112 MCG tablet  -- --  --       Class: Historical    POTASSIUM CITRATE ER PO  -- --  --       Class: Historical    promethazine (PHENERGAN) 25 MG tablet  -- --  --       Sig: Take 25 mg by mouth.    Class: Historical    Route: Oral    rosuvastatin (CRESTOR) 10 MG tablet   -- --  --       Class: Historical    triamcinolone (KENALOG) 0.1 % external cream  -- --  --       Sig: Apply topically.    Class: Historical    Route: Topical    varenicline (TYRVAYA) 0.03 MG/ACT nasal spray  -- -- 1/25/2024 --       Sig: Spray 1 Application in nostril.    Class: Historical    Route: Nasal    zolpidem (AMBIEN) 10 MG tablet  -- --  --       Sig: Take 10 mg by mouth.    Class: Historical    Route: Oral            Exam:      Body mass index is 22.67 kg/m .  Constitutional - A&O in NAD.   Eyes - no redness or discharge.  Sclera anicteric  Respiratory - no cough, no labored breathing  Musculoskeletal - range of motion normal  Skin - no discoloration, no jaundice  Neurological - no tremors.  No facial droop or dysarthria  Psychiatric - normal mood and affect  The rest of a comprehensive physical examination is deferred due to PHE (public health emergency) video visit restrictions     Diagnostics:   Recent Results (from the past 2 weeks)   West Nile Virus Antibody IgG IgM    Collection Time: 01/30/25  7:23 AM   Result Value Ref Range    West Nile IgG Serum 0.17 <=1.29 IV    West Nile IgM Serum 0.00 <=0.89 IV   Treponema Abs w Reflex to RPR and Titer    Collection Time: 01/30/25  7:23 AM   Result Value Ref Range    Treponema Antibody Total Nonreactive Nonreactive   HIV Antigen Antibody Combo Pretransplant Cascade    Collection Time: 01/30/25  7:23 AM   Result Value Ref Range    HIV Antigen Antibody Combo Pretransplant Nonreactive Nonreactive   Hepatitis C antibody    Collection Time: 01/30/25  7:23 AM   Result Value Ref Range    Hepatitis C Antibody Nonreactive Nonreactive   Hepatitis B surface antigen    Collection Time: 01/30/25  7:23 AM   Result Value Ref Range    Hepatitis B Surface Antigen Nonreactive Nonreactive   Hepatitis B Surface Antibody    Collection Time: 01/30/25  7:23 AM   Result Value Ref Range    Hepatitis B Surface Antibody Reactive     Hepatitis B Surface Antibody Instrument Value 29.00  <8.5 m[IU]/mL   Hepatitis B core antibody    Collection Time: 01/30/25  7:23 AM   Result Value Ref Range    Hepatitis B Core Antibody Total Nonreactive Nonreactive   Albumin Random Urine Quantitative with Creat Ratio    Collection Time: 01/30/25  7:23 AM   Result Value Ref Range    Creatinine Urine mg/dL 65.0 mg/dL    Albumin Urine mg/L <12.0 mg/L    Albumin Urine mg/g Cr     Protein  random urine    Collection Time: 01/30/25  7:23 AM   Result Value Ref Range    Total Protein Urine mg/dL 6.2   mg/dL    Total Protein Urine mg/mg Creat 0.09 0.00 - 0.20 mg/mg Cr    Creatinine Urine mg/dL 66.6 mg/dL   Routine UA with microscopic    Collection Time: 01/30/25  7:23 AM   Result Value Ref Range    Color Urine Light Yellow Colorless, Straw, Light Yellow, Yellow    Appearance Urine Clear Clear    Glucose Urine Negative Negative mg/dL    Bilirubin Urine Negative Negative    Ketones Urine Negative Negative mg/dL    Specific Gravity Urine 1.009 1.003 - 1.035    Blood Urine Negative Negative    pH Urine 6.5 5.0 - 7.0    Protein Albumin Urine Negative Negative mg/dL    Urobilinogen Urine Normal Normal, 2.0 mg/dL    Nitrite Urine Negative Negative    Leukocyte Esterase Urine Large (A) Negative    RBC Urine 5 (H) <=2 /HPF    WBC Urine 4 <=5 /HPF    Squamous Epithelials Urine 7 (H) <=1 /HPF    Transitional Epithelials Urine <1 <=1 /HPF   CBC with platelets    Collection Time: 01/30/25  7:23 AM   Result Value Ref Range    WBC Count 5.7 4.0 - 11.0 10e3/uL    RBC Count 4.36 3.80 - 5.20 10e6/uL    Hemoglobin 13.9 11.7 - 15.7 g/dL    Hematocrit 41.1 35.0 - 47.0 %    MCV 94 78 - 100 fL    MCH 31.9 26.5 - 33.0 pg    MCHC 33.8 31.5 - 36.5 g/dL    RDW 13.9 10.0 - 15.0 %    Platelet Count 312 150 - 450 10e3/uL   Partial thromboplastin time    Collection Time: 01/30/25  7:23 AM   Result Value Ref Range    aPTT 26 22 - 38 Seconds   INR    Collection Time: 01/30/25  7:23 AM   Result Value Ref Range    INR 0.96 0.85 - 1.15   Cystatin C with GFR     Collection Time: 01/30/25  7:23 AM   Result Value Ref Range    Cystatin C 0.9 0.6 - 1.0 mg/L    GFR Calculated with Cystatin C 81 >=60 mL/min/1.73m2   Hemoglobin A1c    Collection Time: 01/30/25  7:23 AM   Result Value Ref Range    Estimated Average Glucose 120 (H) <117 mg/dL    Hemoglobin A1C 5.8 (H) <5.7 %   Phosphorus    Collection Time: 01/30/25  7:23 AM   Result Value Ref Range    Phosphorus 3.7 2.5 - 4.5 mg/dL   Uric acid    Collection Time: 01/30/25  7:23 AM   Result Value Ref Range    Uric Acid 5.2 2.4 - 5.7 mg/dL   Lipid Profile    Collection Time: 01/30/25  7:23 AM   Result Value Ref Range    Cholesterol 179 <200 mg/dL    Triglycerides 83 <150 mg/dL    Direct Measure HDL 97 >=50 mg/dL    LDL Cholesterol Calculated 65 <100 mg/dL    Non HDL Cholesterol 82 <130 mg/dL    Patient Fasting > 8hrs? Yes    Comprehensive metabolic panel    Collection Time: 01/30/25  7:23 AM   Result Value Ref Range    Sodium 135 135 - 145 mmol/L    Potassium 3.9 3.4 - 5.3 mmol/L    Carbon Dioxide (CO2) 27 22 - 29 mmol/L    Anion Gap 8 7 - 15 mmol/L    Urea Nitrogen 9.6 8.0 - 23.0 mg/dL    Creatinine 0.71 0.51 - 0.95 mg/dL    GFR Estimate >90 >60 mL/min/1.73m2    Calcium 9.3 8.8 - 10.4 mg/dL    Chloride 100 98 - 107 mmol/L    Glucose 94 70 - 99 mg/dL    Alkaline Phosphatase 89 40 - 150 U/L    AST 33 0 - 45 U/L    ALT 22 0 - 50 U/L    Protein Total 7.2 6.4 - 8.3 g/dL    Albumin 4.5 3.5 - 5.2 g/dL    Bilirubin Total 0.4 <=1.2 mg/dL    Patient Fasting > 8hrs? Yes    Quantiferon TB Gold Plus Grey Tube    Collection Time: 01/30/25  7:23 AM    Specimen: Peripheral IV; Blood   Result Value Ref Range    Quantiferon Nil Tube 0.04 IU/mL   Quantiferon TB Gold Plus Green Tube    Collection Time: 01/30/25  7:23 AM    Specimen: Peripheral IV; Blood   Result Value Ref Range    Quantiferon TB1 Tube 0.03 IU/mL   Quantiferon TB Gold Plus Yellow Tube    Collection Time: 01/30/25  7:23 AM    Specimen: Peripheral IV; Blood   Result Value Ref Range     Quantiferon TB2 Tube 0.03    Quantiferon TB Gold Plus Purple Tube    Collection Time: 01/30/25  7:23 AM    Specimen: Peripheral IV; Blood   Result Value Ref Range    Quantiferon Mitogen 10.00 IU/mL   Adult Type and Screen    Collection Time: 01/30/25  7:23 AM   Result Value Ref Range    ABO/RH(D) O POS     Antibody Screen Negative Negative    SPECIMEN EXPIRATION DATE [id number]    TSH with free T4 reflex    Collection Time: 01/30/25  7:23 AM   Result Value Ref Range    TSH 0.70 0.30 - 4.20 uIU/mL   Quantiferon TB Gold Plus    Collection Time: 01/30/25  7:23 AM    Specimen: Peripheral IV; Blood   Result Value Ref Range    Quantiferon-TB Gold Plus Negative Negative    TB1 Ag minus Nil Value -0.01 IU/mL    TB2 Ag minus Nil Value -0.01 IU/mL    Mitogen minus Nil Result 9.96 IU/mL    Nil Result 0.04 IU/mL   ABO and Rh 2nd type and screen required    Collection Time: 01/30/25  7:46 AM   Result Value Ref Range    ABO/RH(D) O POS     SPECIMEN EXPIRATION DATE [id number]    EKG 12-lead complete w/read - Clinics    Collection Time: 01/30/25 12:59 PM   Result Value Ref Range    Systolic Blood Pressure  mmHg    Diastolic Blood Pressure  mmHg    Ventricular Rate 65 BPM    Atrial Rate 65 BPM    AZ Interval 148 ms    QRS Duration 90 ms     ms    QTc 445 ms    P Axis 84 degrees    R AXIS 90 degrees    T Axis 68 degrees    Interpretation ECG       Sinus rhythm  Possible Left atrial enlargement  Rightward axis  Borderline ECG  No previous ECGs available  Confirmed by fellow Hector Acuna ([id number]) on 1/31/2025 11:42:04 AM  Confirmed by MD SNOW HENRI ([id number]) on 2/1/2025 1:47:55 PM     UA with Microscopic    Collection Time: 01/30/25  1:03 PM   Result Value Ref Range    Color Urine Straw Colorless, Straw, Light Yellow, Yellow    Appearance Urine Clear Clear    Glucose Urine Negative Negative mg/dL    Bilirubin Urine Negative Negative    Ketones Urine Negative Negative mg/dL    Specific Gravity Urine 1.005 1.003 -  1.035    Blood Urine Negative Negative    pH Urine 7.0 5.0 - 7.0    Protein Albumin Urine Negative Negative mg/dL    Urobilinogen Urine Normal Normal, 2.0 mg/dL    Nitrite Urine Negative Negative    Leukocyte Esterase Urine Negative Negative    RBC Urine <1 <=2 /HPF    WBC Urine 1 <=5 /HPF    Squamous Epithelials Urine <1 <=1 /HPF    Transitional Epithelials Urine <1 <=1 /HPF     Again, thank you for allowing me to participate in the care of your patient.        Sincerely,        Carlos Eduardo Marks MD    Electronically signed

## 2025-01-30 NOTE — LETTER
2025      Shaniqua Perez  [address]  [place] [address]      Dear Colleague,    Thank you for referring your patient, Shaniqua Perez, to the [facility]. Please see a copy of my visit note below.    Donor Iohexol test    Shaniqua Perez presents today to [facility] for a Donor Iohexol test.      Progress note:  ID verified by name and .     The following information was verified with the patient:  Female Patients is there any possibility of being pregnant No  Is there a history of allergy (skin rash, swelling, ect) to:   A.  Iodine (except skin reactions to betadine): No   B. Intravenous radio-contrast agents: No   C. Seafood No     present during visit today: Not Applicable.    R.N. provided patient with educational handout regarding timed test. Yes    23 G placed in Right antecubital at [facility], then blood drawn and Iohexol administered over 2 minutes.  Positive blood return verified before and after injection. Right antecubital PIV removed.  20 gauge PIV placed in lab for blood draws and CT this afternoon.    Medication administered:  Iohexol (Omnipaque 350mg iodine/ml concentration) 4mls.    Start time: 0751 am  Stop time: 0753 am      Administrations This Visit       iohexol (OMNIPAQUE) 350 MG/ML injectable solution 4 mL       Admin Date  2025 Action  $Given Dose  4 mL Route  Intravenous Documented By  Kinsey Lawrence, RN              sodium chloride (PF) 0.9% PF flush 5 mL       Admin Date  2025 Action  $Given Dose  5 mL Route  Intracatheter Documented By  Kinsey Lawrence, RN                        Evaluation nurse in transplant to draw labs at 2 and 4 hours post iohexol administration.  Patient given a slip with the times to get labs drawn and verbalized understanding of the plan.    Patient tolerated the procedure:  Yes    After the infusion patient was discharged to the next appointment.      /68 (BP Location: Right arm, Patient Position: Sitting,  "Cuff Size: Adult Regular)   Pulse 74   Temp 97.5  F (36.4  C) (Tympanic)   Resp 18   Ht 1.676 m (5' 6\")   Wt 64.7 kg (142 lb 9.6 oz)   SpO2 98%   BMI 23.02 kg/m       Kinsey Lawrence RN        Again, thank you for allowing me to participate in the care of your patient.        Sincerely,        Specialty Infusion Nurse    Electronically signed"

## 2025-01-30 NOTE — PATIENT INSTRUCTIONS
Dear Shaniqua Perez    Thank you for choosing [facility] for your infusion and lab draw.  The following information is a summary of our appointment as well as important reminders.        If you have any questions on your upcoming Specialty Infusion appointments, please call scheduling at [phone].  It was a pleasure taking care of you today.    Sincerely,    [facility]  [address] [place] [address]  [id number]  Phone:  [phone]

## 2025-01-30 NOTE — NURSING NOTE
Chief Complaint   Patient presents with    Blood Draw     Labs drawn via PIV placed by RN. VS taken.     Labs drawn from PIV placed by RN. Line flushed with saline. Vitals taken. Pt checked in for appointment(s).     Shwetha Javed RN

## 2025-01-30 NOTE — NURSING NOTE
"Saw Shaniqua in clinic on 2025 for Living Kidney Donor Evaluation.      She is interested in donation to or on behalf of her brother, John.  ABO O --> O.  Interim/Flow XM negative (reviewed in 2024): IKTP note: \"Discussion: Compatibility testing including donor/recip characteristics and flow cross match reviewed. No DSA, size slightly suboptimal but okay.\"     Recommendations:   Okay for direct donation pending evaluation.         I provided a folder which included copies of the followin.Living Kidney Donor Evaluation Consent  2.Paired Exchange Consent  3.Donor Shield Pamphlet  4.Living Donor Collective Study information  5.Kidney for Life pamphlet  6.Kidney Donors are Heroes! Study synopsis  7.Most current SRTR data.       Provided donor with a parking pass.     I reviewed the Living Kidney Donor Evaluation Consent, dated 2023 and supplied a copy of Paired Exchange/NDD consent dated 2023.  I answered any questions.       Evaluation Notes:  Internal typing: sent previously during interim XM  UA contaminated --> new sample sent  Elevated BP.  Repeated 3 BP later in visit and still elevated.  Per Shaniqua, her baseline BP is 110/70.  Counseled Shaniqua that she may need a 24 hour BP cuff if she continues in donor evaluation.     Mikala Mcguire RN, BSN, CCTN  Living Donor Coordinator  [phone]           "

## 2025-02-04 ENCOUNTER — DOCUMENTATION ONLY (OUTPATIENT)
Dept: TRANSPLANT | Facility: CLINIC | Age: 70
End: 2025-02-04

## 2025-02-04 LAB
BSA: 1.74 M2
COEFF DETERMINATION: 0.99 %
IOHEXOL CL UR+SERPL-VRATE: 4.35 MG/DL
IOHEXOL CL UR+SERPL-VRATE: 4.35 MG/DL
IOHEXOL CL UR+SERPL-VRATE: 5.99 MG/DL
IOHEXOL CL UR+SERPL-VRATE: 5.99 MG/DL
IOHEXOL CL UR+SERPL-VRATE: 82 /1.73 M2
IOHEXOL CL UR+SERPL-VRATE: 83 ML/MIN
IOHEXOL CL UR+SERPL-VRATE: 9.81 MG/DL

## 2025-02-04 NOTE — PROGRESS NOTES
Living Donor Imaging Review Note  Date of Review: February 4, 2025 4:32 PM with the following surgeon(s) present: Finger    Suggested side for kidney for donation is left.  Is the other laterality available?  Yes     LEFT kidney  Is there early bifurcation? No If Yes, upon procurement how many renal arteries are expected? NA  Decision ultimately deferred to operating surgeon.     Incidental findings reviewed:   Aortic atherosclerotic calcification of infrarenal aorta- okay, no further evaluation needed  Osteoarthrosis of hips- okay, no further evaluation needed  Periumbilical hernia- okay to proceed, could likely be repaired during donation surgery

## 2025-02-05 ENCOUNTER — TELEPHONE (OUTPATIENT)
Dept: TRANSPLANT | Facility: CLINIC | Age: 70
End: 2025-02-05

## 2025-02-05 ENCOUNTER — COMMITTEE REVIEW (OUTPATIENT)
Dept: TRANSPLANT | Facility: CLINIC | Age: 70
End: 2025-02-05

## 2025-02-05 DIAGNOSIS — Z00.5 TRANSPLANT DONOR EVALUATION: Primary | ICD-10-CM

## 2025-02-05 NOTE — TELEPHONE ENCOUNTER
Pt was to call us back- she will see her GP on Fri  and too see if there was more labs for them to draw at that time

## 2025-02-05 NOTE — LETTER
PHYSICIAN ORDERS  DONOR LAB ORDERS      Patient Name: Shaniqua Perez   YOB: 1955     Formerly [facility] MR# [if applicable]: [id number]   Date & Time: February 6, 2025  10:18 AM  DIAGNOSIS:  Potential kidney donor  ICD-10 CODE:  Z00.5      CMV antibody IgG  EBV antibody IgG  EBV antibody IgM      PLEASE see below billing instructions and FAX RESULTS [phone]      Chon Meadows MD             REIMBURSEMENT INFORMATION FOR LIVING ORGAN DONORS    LIVING ORGAN DONOR: This form MUST accompany & remain attached to Orders &  given to Provider and/or Healthcare Facility Business Office    PROVIDER/FACILITY INSTRUCTIONS: By accepting to perform these services for living organ  donation, the provider/facility agrees to exclusively bill the [facility] instead of billing  the patient or any insurance provider and agrees to accept the reimbursement, as described below, as  payment in full for services rendered.    PROVIDER BILLING INSTRUCTIONS:  1. [facility] agrees to pay for all authorized testing ordered by our transplant  program that is related to living organ donation. The attached orders/tests are part of the donor  Evaluation.    2. Do not bill the donor or donor's insurance. Send an itemized invoice, claim or statement to:    [facility]  Transplant Finance/Donor Billing  [address] [place] [address], [place] [address]    3. Billing statements must include the patient first and last name, date of birth, the CPT procedure code  and date of service. Please bill service on the ORIGINAL UBO4 or 1500 with appropriate CPT/HCPCS  codes along with W-9 and send to the above address to insure timely reimbursement. To help us ensure timely reimbursement, please include a copy of your facilities W-9 Form and a  information, first and last name, email and phone number for billing questions.    4. Claims should be submitted no later  than six months from the date when services are rendered.  Claims denied for late submission should not be billed to the donor or their private insurance carrier.    5. [facility] will reimburse all charges at 100% of the Medicare Fee Schedule as  defined in the Code of Federal Regulations (CFR) 42, Chapter IV. This is to be considered payment  in full. [facility], the patient, and/or the patient's insurance are NOT to  be billed any balance, co-payment, or deductible, per Medicare regulations. **ATTN: Facility  providing services for attached/enclosed Living Donor Orders; If facility does NOT AGREE to  the reimbursement rate stated above, PLEASE DENY SERVICES & refer Donor/patient back to  their [facility].    6. Patients are NOT to make any payments at the time of service.    Please forward this information to your billing department so that a donor account can be set up with  these instructions.    Should you have any questions, please contact the Donor Billing office at [phone],  Monday - Friday, 8:00 a.m. to 4:00 p.m.   Thank you for your assistance.

## 2025-02-05 NOTE — TELEPHONE ENCOUNTER
Committee recommendations reviewed with Shaniqua. She verbalized understanding and confirmed her desire to proceed. She will have her derm and urology records faxed and will review the stress echo results and cardiology consult recommendations with her PCP. Plan for 24hour BP once cardiology plan in place. Per Shaniqua's request EKG, stress echo, nephrology note and committee note faxed to her PCP (Dr. Esquivel).

## 2025-02-05 NOTE — COMMITTEE REVIEW
Living Donor Committee Review Note Evaluation Date: 1/30/2025  Committee Review Date: 2/5/2025    Donor being evaluated for: Kidney    Transplant Phase: Evaluation  Transplant Status: Active    Transplant Coordinator: Marian Vick  Transplant Surgeon:        Committee Review Members:  Independent Living Donor Advocate Eulalia Lopez, [facility], [place] Benz   Nephrology Valentino Can MD, Benito Newby MD   Nutrition Phyllis Norman, RD   Pharmacist Marian Benedict, [facility]   Transplant Joanie Gabriella Mcguire, RN, Ju Mccarty MSW, Linh Gamez, RN, Karoline Olivares LPN, Chantal Burgess, RN, Marian Vick, NADJA, Esme Pugh, RN   Transplant Surgery Mari Hernandez MD, MD       Transplant Eligibility: Acceptable Mental Health    Committee Review Decision: Needs Re-presentation    Relative Contraindications:     Absolute Contraindications:     Committee Chair Mari Hernandez MD verbally attested to the committee's decision.    Committee Discussion Details:     BPs- will need 24hour BP    Abnormal EKG and stress echo- needs cardiology consult (should review with PCP to coordinate locally)    Stone hx- repeat [employer] as planned    A1c- acceptable, no further evaluation needed, continue to follow with PCP    Hx squamous cell carcinoma- obtain dermatology notes    Hx hematuria work up with urology- obtain records    ETOH intake- review recommendation for <7 drinks per week    Needs CMV, EBV and confirmation of up to date health maintenance     CT reviewed at prior Imaging Review meeting- no follow up needed

## 2025-02-10 ENCOUNTER — DOCUMENTATION ONLY (OUTPATIENT)
Dept: TRANSPLANT | Facility: CLINIC | Age: 70
End: 2025-02-10

## 2025-02-10 NOTE — LETTER
PHYSICIAN ORDERS  DONOR ORDERS      Patient Name: Shaniqua Perez   YOB: 1955     Formerly [facility] MR# [if applicable]: [id number]   Date & Time: February 10, 2025  11:32 AM  DIAGNOSIS:  Potential kidney donor  ICD-10 CODE:  Z00.5      Cardiology referral  Indication: kidney donor evaluation, stress echo ECG equivocal, evaluate risk for an elective kidney donor nephrectomy surgery      Please fax records to [phone]              Chon Meadows MD           REIMBURSEMENT INFORMATION FOR LIVING ORGAN DONORS    LIVING ORGAN DONOR: This form MUST accompany & remain attached to Orders &  given to Provider and/or Healthcare Facility Business Office    PROVIDER/FACILITY INSTRUCTIONS: By accepting to perform these services for living organ  donation, the provider/facility agrees to exclusively bill the [facility] instead of billing  the patient or any insurance provider and agrees to accept the reimbursement, as described below, as  payment in full for services rendered.    PROVIDER BILLING INSTRUCTIONS:  1. [facility] agrees to pay for all authorized testing ordered by our transplant  program that is related to living organ donation. The attached orders/tests are part of the donor  Evaluation.    2. Do not bill the donor or donor's insurance. Send an itemized invoice, claim or statement to:    [facility]  Transplant Finance/Donor Billing  [address] [place] [address], [place] [address]    3. Billing statements must include the patient first and last name, date of birth, the CPT procedure code  and date of service. Please bill service on the ORIGINAL UBO4 or 1500 with appropriate CPT/HCPCS  codes along with W-9 and send to the above address to insure timely reimbursement. To help us ensure timely reimbursement, please include a copy of your facilities W-9 Form and a  information, first and last name, email and phone number for  billing questions.    4. Claims should be submitted no later than six months from the date when services are rendered.  Claims denied for late submission should not be billed to the donor or their private insurance carrier.    5. [facility] will reimburse all charges at 100% of the Medicare Fee Schedule as  defined in the Code of Federal Regulations (CFR) 42, Chapter IV. This is to be considered payment  in full. [facility], the patient, and/or the patient's insurance are NOT to  be billed any balance, co-payment, or deductible, per Medicare regulations. **ATTN: Facility  providing services for attached/enclosed Living Donor Orders; If facility does NOT AGREE to  the reimbursement rate stated above, PLEASE DENY SERVICES & refer Donor/patient back to  their [facility].    6. Patients are NOT to make any payments at the time of service.    Please forward this information to your billing department so that a donor account can be set up with  these instructions.    Should you have any questions, please contact the Donor Billing office at [phone],  Monday - Friday, 8:00 a.m. to 4:00 p.m.   Thank you for your assistance.

## 2025-02-10 NOTE — PROGRESS NOTES
Shaniqua is coordinating a cardiology referral with her PCP. Donor team referral also sent to Shaniqua to share with the cardiologist. Request placed for 24 hour BP. Shaniqua updated.

## 2025-02-12 ENCOUNTER — DOCUMENTATION ONLY (OUTPATIENT)
Dept: TRANSPLANT | Facility: CLINIC | Age: 70
End: 2025-02-12

## 2025-02-12 NOTE — PROGRESS NOTES
Incompatible Kidney Transplant Program Meeting     Attendees: Dr. Su, Dr. Castro, donor/recip coordinator team    Discussion:   Compatibility testing including prior immunology evaluation, donor/recip characteristics including size and donor GFR reviewed.     Recommendations:   Consider paired exchange for a larger kidney however direct okay if donor only open to direct.

## 2025-02-13 ENCOUNTER — TRANSFERRED RECORDS (OUTPATIENT)
Dept: HEALTH INFORMATION MANAGEMENT | Facility: CLINIC | Age: 70
End: 2025-02-13

## 2025-02-16 ENCOUNTER — HEALTH MAINTENANCE LETTER (OUTPATIENT)
Age: 70
End: 2025-02-16

## 2025-02-17 ENCOUNTER — TRANSFERRED RECORDS (OUTPATIENT)
Dept: HEALTH INFORMATION MANAGEMENT | Facility: CLINIC | Age: 70
End: 2025-02-17

## 2025-02-19 ENCOUNTER — DOCUMENTATION ONLY (OUTPATIENT)
Dept: TRANSPLANT | Facility: CLINIC | Age: 70
End: 2025-02-19

## 2025-02-19 NOTE — PROGRESS NOTES
Emailed to Shaniqua the [employer] orders and instructions with billing letter. Faxed over to [employer] company the orders with the billing letter.

## 2025-03-05 ENCOUNTER — TELEPHONE (OUTPATIENT)
Dept: TRANSPLANT | Facility: CLINIC | Age: 70
End: 2025-03-05

## 2025-03-05 ENCOUNTER — COMMITTEE REVIEW (OUTPATIENT)
Dept: TRANSPLANT | Facility: CLINIC | Age: 70
End: 2025-03-05

## 2025-03-05 NOTE — TELEPHONE ENCOUNTER
Call made to Shaniqua to review recommendation from committee. Thanked Shaniqua for everything she did undergoing evaluation. Discussed the many ways people can support recipients and encouraged her to call with any questions in the future. Shaniqua verbalize understanding and acknowledged being very disappointed she couldn't proceed. She knows how to reach me with questions. Plan for ANGELIKA follow up call later this week (message sent).

## 2025-03-05 NOTE — COMMITTEE REVIEW
Living Donor Committee Review Note Evaluation Date: 1/30/2025  Committee Review Date: 3/5/2025    Donor being evaluated for: Kidney    Transplant Phase: Evaluation  Transplant Status: Active    Transplant Coordinator: Marian Vick  Transplant Surgeon:        Committee Review Members:  Independent Living Donor Advocate Eulalia Lopez, [facility], [place] Benz   Nephrology Maura Fonseca MD, Chon Meadows MD   Nutrition Phyllis Norman, RD   Pharmacist Marian Benedict, [facility]   Transplant Joanie Gabriella Mcguire, RN, Ju Mccarty MSW, Celina Alfredo, RN, Karoline Olivares LPN, Chantal Burgess, RN, Marian Vick, NADJA, Esme Pugh, RN   Transplant Surgery Mari Hernandez MD, MD       Transplant Eligibility: Acceptable Mental Health    Committee Review Decision: Declined    Relative Contraindications: Other    Absolute Contraindications:     Committee Chair Mari Hernandez MD verbally attested to the committee's decision.    Committee Discussion Details:     1.24hour BP, stress echo, EKG, renal CTA and cardiology consult reviewed and discussed- due to previously noted EKG changes also noted on stress echo, atherosclerosis on renal CTA, age, hx hyperlipidemia and elevated hgb A1c team recommended patient not proceed as a donor due to elevated risk.

## 2025-03-05 NOTE — LETTER
Ms. Shaniqua Perez   [address]  [place] [address]   March 5, 2025     Dear Shaniqua   I am writing on behalf of the Transplant Program at the [facility]. I would like to take this opportunity to thank you for recently undergoing an evaluation as a possible kidney donor.   Your evaluation is complete and your test results have been reviewed and discussed by the donor team at our weekly meeting. After careful review of the details of your evaluation, the donor team has determined that you do not meet criteria for living kidney donation and should not be a kidney donor. I would be happy to discuss the details of your evaluation testing with you.   Once again, thank you for your generous offer and for your time and effort that went into this process.   Sincerely,  Marian Vick RN  Living Donor Coordinator

## 2025-03-12 ENCOUNTER — TELEPHONE (OUTPATIENT)
Dept: TRANSPLANT | Facility: CLINIC | Age: 70
End: 2025-03-12

## 2025-03-12 NOTE — TELEPHONE ENCOUNTER
"ANGELIKA called Shaniqua again to check in on how she was coping after learning she cannot move forward as a donor.     She admits that she is \"not great.\" She is wrote her brother a letter to express how sad she feels about not being about to help him and that she feels like \"she failed.\" ANGELIKA provided active listening, but also reframed this emotion by explaining that simply coming forward to try to donate was an act of altruism. She states that she understands, but her emotions are still raw. She also feels very bad for her brother, as he has a lot of family health issues and wishes that he would talk to therapist. Discussed focusing on what we can and cannot control other people doing. She does have a MH counselor she can call if her mood worsens. She knows she will \"get through this.\"     ANGELIKA reminded her that ANGELIKA services are available to her at any time and to please call if she needs support. She expressed gratitude.     [place] Benz, [facility] Hospital[facility], CCTSW   Independent Living Donor Advocate  [facility]  Direct: [phone]  E-Mail: [email or web address]    "